# Patient Record
Sex: MALE | Race: BLACK OR AFRICAN AMERICAN | NOT HISPANIC OR LATINO | Employment: FULL TIME | URBAN - METROPOLITAN AREA
[De-identification: names, ages, dates, MRNs, and addresses within clinical notes are randomized per-mention and may not be internally consistent; named-entity substitution may affect disease eponyms.]

---

## 2017-04-20 ENCOUNTER — LAB (OUTPATIENT)
Dept: LAB | Facility: HOSPITAL | Age: 52
End: 2017-04-20
Attending: ORTHOPAEDIC SURGERY

## 2017-04-20 ENCOUNTER — HOSPITAL ENCOUNTER (OUTPATIENT)
Dept: GENERAL RADIOLOGY | Facility: HOSPITAL | Age: 52
Discharge: HOME | End: 2017-04-20
Attending: ORTHOPAEDIC SURGERY

## 2017-04-20 ENCOUNTER — TRANSCRIBE ORDERS (OUTPATIENT)
Dept: LAB | Facility: HOSPITAL | Age: 52
End: 2017-04-20

## 2017-04-20 ENCOUNTER — HOSPITAL ENCOUNTER (OUTPATIENT)
Dept: CARDIOLOGY | Facility: HOSPITAL | Age: 52
Discharge: HOME | End: 2017-04-20
Attending: ORTHOPAEDIC SURGERY | Admitting: ORTHOPAEDIC SURGERY

## 2017-04-20 DIAGNOSIS — Z01.811 PRE-OP CHEST EXAM: ICD-10-CM

## 2017-04-20 DIAGNOSIS — Z01.818 PRE-OP TESTING: ICD-10-CM

## 2017-04-20 DIAGNOSIS — Z01.818 PRE-OP TESTING: Primary | ICD-10-CM

## 2017-04-20 LAB
ALBUMIN SERPL-MCNC: 4 G/DL (ref 3.5–5.2)
ALBUMIN/GLOB SERPL: 1.1 G/DL
ALP SERPL-CCNC: 70 U/L (ref 39–117)
ALT SERPL W P-5'-P-CCNC: 16 U/L (ref 1–41)
ANION GAP SERPL CALCULATED.3IONS-SCNC: 14.3 MMOL/L
AST SERPL-CCNC: 14 U/L (ref 1–40)
BACTERIA UR QL AUTO: ABNORMAL /HPF
BASOPHILS # BLD AUTO: 0.01 10*3/MM3 (ref 0–0.2)
BASOPHILS NFR BLD AUTO: 0.1 % (ref 0–1.5)
BILIRUB SERPL-MCNC: 0.3 MG/DL (ref 0.1–1.2)
BILIRUB UR QL STRIP: NEGATIVE
BUN BLD-MCNC: 13 MG/DL (ref 6–20)
BUN/CREAT SERPL: 16.9 (ref 7–25)
CALCIUM SPEC-SCNC: 9.1 MG/DL (ref 8.6–10.5)
CHLORIDE SERPL-SCNC: 102 MMOL/L (ref 98–107)
CLARITY UR: CLEAR
CO2 SERPL-SCNC: 22.7 MMOL/L (ref 22–29)
COLOR UR: YELLOW
CREAT BLD-MCNC: 0.77 MG/DL (ref 0.76–1.27)
DEPRECATED RDW RBC AUTO: 40.5 FL (ref 37–54)
EOSINOPHIL # BLD AUTO: 0.13 10*3/MM3 (ref 0–0.7)
EOSINOPHIL NFR BLD AUTO: 1.3 % (ref 0.3–6.2)
ERYTHROCYTE [DISTWIDTH] IN BLOOD BY AUTOMATED COUNT: 13.1 % (ref 11.5–14.5)
GFR SERPL CREATININE-BSD FRML MDRD: 130 ML/MIN/1.73
GLOBULIN UR ELPH-MCNC: 3.6 GM/DL
GLUCOSE BLD-MCNC: 118 MG/DL (ref 65–99)
GLUCOSE UR STRIP-MCNC: NEGATIVE MG/DL
HCT VFR BLD AUTO: 44.8 % (ref 40.4–52.2)
HGB BLD-MCNC: 14.8 G/DL (ref 13.7–17.6)
HGB UR QL STRIP.AUTO: NEGATIVE
HYALINE CASTS UR QL AUTO: ABNORMAL /LPF
IMM GRANULOCYTES # BLD: 0.02 10*3/MM3 (ref 0–0.03)
IMM GRANULOCYTES NFR BLD: 0.2 % (ref 0–0.5)
KETONES UR QL STRIP: NEGATIVE
LEUKOCYTE ESTERASE UR QL STRIP.AUTO: ABNORMAL
LYMPHOCYTES # BLD AUTO: 2.23 10*3/MM3 (ref 0.9–4.8)
LYMPHOCYTES NFR BLD AUTO: 22.8 % (ref 19.6–45.3)
MCH RBC QN AUTO: 28.1 PG (ref 27–32.7)
MCHC RBC AUTO-ENTMCNC: 33 G/DL (ref 32.6–36.4)
MCV RBC AUTO: 85 FL (ref 79.8–96.2)
MONOCYTES # BLD AUTO: 0.41 10*3/MM3 (ref 0.2–1.2)
MONOCYTES NFR BLD AUTO: 4.2 % (ref 5–12)
NEUTROPHILS # BLD AUTO: 6.97 10*3/MM3 (ref 1.9–8.1)
NEUTROPHILS NFR BLD AUTO: 71.4 % (ref 42.7–76)
NITRITE UR QL STRIP: NEGATIVE
PH UR STRIP.AUTO: <=5 [PH] (ref 5–8)
PLATELET # BLD AUTO: 249 10*3/MM3 (ref 140–500)
PMV BLD AUTO: 9.9 FL (ref 6–12)
POTASSIUM BLD-SCNC: 4.1 MMOL/L (ref 3.5–5.2)
PROT SERPL-MCNC: 7.6 G/DL (ref 6–8.5)
PROT UR QL STRIP: NEGATIVE
RBC # BLD AUTO: 5.27 10*6/MM3 (ref 4.6–6)
RBC # UR: ABNORMAL /HPF
REF LAB TEST METHOD: ABNORMAL
SODIUM BLD-SCNC: 139 MMOL/L (ref 136–145)
SP GR UR STRIP: 1.02 (ref 1–1.03)
SQUAMOUS #/AREA URNS HPF: ABNORMAL /HPF
UROBILINOGEN UR QL STRIP: ABNORMAL
WBC NRBC COR # BLD: 9.77 10*3/MM3 (ref 4.5–10.7)
WBC UR QL AUTO: ABNORMAL /HPF

## 2017-04-20 PROCEDURE — 71020 HC CHEST PA AND LATERAL: CPT

## 2017-04-20 PROCEDURE — 81001 URINALYSIS AUTO W/SCOPE: CPT

## 2017-04-20 PROCEDURE — 93005 ELECTROCARDIOGRAM TRACING: CPT | Performed by: ORTHOPAEDIC SURGERY

## 2017-04-20 PROCEDURE — 80053 COMPREHEN METABOLIC PANEL: CPT

## 2017-04-20 PROCEDURE — 85025 COMPLETE CBC W/AUTO DIFF WBC: CPT

## 2017-04-20 PROCEDURE — 93010 ELECTROCARDIOGRAM REPORT: CPT | Performed by: INTERNAL MEDICINE

## 2017-04-20 PROCEDURE — 36415 COLL VENOUS BLD VENIPUNCTURE: CPT

## 2018-05-17 ENCOUNTER — HOSPITAL ENCOUNTER (EMERGENCY)
Facility: HOSPITAL | Age: 53
Discharge: HOME | End: 2018-05-17
Attending: EMERGENCY MEDICINE | Admitting: EMERGENCY MEDICINE

## 2018-05-17 ENCOUNTER — APPOINTMENT (OUTPATIENT)
Dept: GENERAL RADIOLOGY | Facility: HOSPITAL | Age: 53
End: 2018-05-17

## 2018-05-17 ENCOUNTER — APPOINTMENT (OUTPATIENT)
Dept: CT IMAGING | Facility: HOSPITAL | Age: 53
End: 2018-05-17

## 2018-05-17 VITALS
OXYGEN SATURATION: 97 % | SYSTOLIC BLOOD PRESSURE: 161 MMHG | HEART RATE: 69 BPM | WEIGHT: 260 LBS | RESPIRATION RATE: 18 BRPM | DIASTOLIC BLOOD PRESSURE: 91 MMHG | HEIGHT: 76 IN | BODY MASS INDEX: 31.66 KG/M2 | TEMPERATURE: 96.8 F

## 2018-05-17 DIAGNOSIS — N20.0 KIDNEY STONE: Primary | ICD-10-CM

## 2018-05-17 LAB
ALBUMIN SERPL-MCNC: 4.1 G/DL (ref 3.5–5.2)
ALBUMIN/GLOB SERPL: 1.2 G/DL
ALP SERPL-CCNC: 64 U/L (ref 39–117)
ALT SERPL W P-5'-P-CCNC: 13 U/L (ref 1–41)
ANION GAP SERPL CALCULATED.3IONS-SCNC: 10 MMOL/L
AST SERPL-CCNC: 16 U/L (ref 1–40)
BACTERIA UR QL AUTO: ABNORMAL /HPF
BASOPHILS # BLD AUTO: 0.02 10*3/MM3 (ref 0–0.2)
BASOPHILS NFR BLD AUTO: 0.2 % (ref 0–1.5)
BILIRUB SERPL-MCNC: 0.3 MG/DL (ref 0.1–1.2)
BILIRUB UR QL STRIP: NEGATIVE
BUN BLD-MCNC: 11 MG/DL (ref 6–20)
BUN/CREAT SERPL: 12.6 (ref 7–25)
CALCIUM SPEC-SCNC: 9.1 MG/DL (ref 8.6–10.5)
CHLORIDE SERPL-SCNC: 107 MMOL/L (ref 98–107)
CLARITY UR: ABNORMAL
CO2 SERPL-SCNC: 27 MMOL/L (ref 22–29)
COLOR UR: YELLOW
CREAT BLD-MCNC: 0.87 MG/DL (ref 0.76–1.27)
DEPRECATED RDW RBC AUTO: 42.5 FL (ref 37–54)
EOSINOPHIL # BLD AUTO: 0.13 10*3/MM3 (ref 0–0.7)
EOSINOPHIL NFR BLD AUTO: 1.4 % (ref 0.3–6.2)
ERYTHROCYTE [DISTWIDTH] IN BLOOD BY AUTOMATED COUNT: 13.4 % (ref 11.5–14.5)
GFR SERPL CREATININE-BSD FRML MDRD: 113 ML/MIN/1.73
GLOBULIN UR ELPH-MCNC: 3.3 GM/DL
GLUCOSE BLD-MCNC: 108 MG/DL (ref 65–99)
GLUCOSE UR STRIP-MCNC: NEGATIVE MG/DL
HCT VFR BLD AUTO: 47.9 % (ref 40.4–52.2)
HGB BLD-MCNC: 15.4 G/DL (ref 13.7–17.6)
HGB UR QL STRIP.AUTO: ABNORMAL
HOLD SPECIMEN: NORMAL
HOLD SPECIMEN: NORMAL
HYALINE CASTS UR QL AUTO: ABNORMAL /LPF
IMM GRANULOCYTES # BLD: 0.02 10*3/MM3 (ref 0–0.03)
IMM GRANULOCYTES NFR BLD: 0.2 % (ref 0–0.5)
KETONES UR QL STRIP: NEGATIVE
LEUKOCYTE ESTERASE UR QL STRIP.AUTO: ABNORMAL
LIPASE SERPL-CCNC: 123 U/L (ref 13–60)
LYMPHOCYTES # BLD AUTO: 3.42 10*3/MM3 (ref 0.9–4.8)
LYMPHOCYTES NFR BLD AUTO: 35.8 % (ref 19.6–45.3)
MCH RBC QN AUTO: 27.9 PG (ref 27–32.7)
MCHC RBC AUTO-ENTMCNC: 32.2 G/DL (ref 32.6–36.4)
MCV RBC AUTO: 86.9 FL (ref 79.8–96.2)
MONOCYTES # BLD AUTO: 0.45 10*3/MM3 (ref 0.2–1.2)
MONOCYTES NFR BLD AUTO: 4.7 % (ref 5–12)
NEUTROPHILS # BLD AUTO: 5.5 10*3/MM3 (ref 1.9–8.1)
NEUTROPHILS NFR BLD AUTO: 57.7 % (ref 42.7–76)
NITRITE UR QL STRIP: NEGATIVE
PH UR STRIP.AUTO: <=5 [PH] (ref 5–8)
PLATELET # BLD AUTO: 232 10*3/MM3 (ref 140–500)
PMV BLD AUTO: 9.9 FL (ref 6–12)
POTASSIUM BLD-SCNC: 4.5 MMOL/L (ref 3.5–5.2)
PROT SERPL-MCNC: 7.4 G/DL (ref 6–8.5)
PROT UR QL STRIP: ABNORMAL
RBC # BLD AUTO: 5.51 10*6/MM3 (ref 4.6–6)
RBC # UR: ABNORMAL /HPF
REF LAB TEST METHOD: ABNORMAL
SODIUM BLD-SCNC: 144 MMOL/L (ref 136–145)
SP GR UR STRIP: 1.03 (ref 1–1.03)
SQUAMOUS #/AREA URNS HPF: ABNORMAL /HPF
UROBILINOGEN UR QL STRIP: ABNORMAL
WBC NRBC COR # BLD: 9.54 10*3/MM3 (ref 4.5–10.7)
WBC UR QL AUTO: ABNORMAL /HPF
WHOLE BLOOD HOLD SPECIMEN: NORMAL
WHOLE BLOOD HOLD SPECIMEN: NORMAL

## 2018-05-17 PROCEDURE — 96361 HYDRATE IV INFUSION ADD-ON: CPT

## 2018-05-17 PROCEDURE — 25010000002 KETOROLAC TROMETHAMINE PER 15 MG: Performed by: NURSE PRACTITIONER

## 2018-05-17 PROCEDURE — 80053 COMPREHEN METABOLIC PANEL: CPT

## 2018-05-17 PROCEDURE — 74018 RADEX ABDOMEN 1 VIEW: CPT

## 2018-05-17 PROCEDURE — 85025 COMPLETE CBC W/AUTO DIFF WBC: CPT

## 2018-05-17 PROCEDURE — 96375 TX/PRO/DX INJ NEW DRUG ADDON: CPT

## 2018-05-17 PROCEDURE — 74176 CT ABD & PELVIS W/O CONTRAST: CPT

## 2018-05-17 PROCEDURE — 87086 URINE CULTURE/COLONY COUNT: CPT | Performed by: EMERGENCY MEDICINE

## 2018-05-17 PROCEDURE — 96374 THER/PROPH/DIAG INJ IV PUSH: CPT

## 2018-05-17 PROCEDURE — 99284 EMERGENCY DEPT VISIT MOD MDM: CPT

## 2018-05-17 PROCEDURE — 81001 URINALYSIS AUTO W/SCOPE: CPT | Performed by: EMERGENCY MEDICINE

## 2018-05-17 PROCEDURE — 83690 ASSAY OF LIPASE: CPT

## 2018-05-17 PROCEDURE — 36415 COLL VENOUS BLD VENIPUNCTURE: CPT

## 2018-05-17 PROCEDURE — 25010000002 ONDANSETRON PER 1 MG: Performed by: NURSE PRACTITIONER

## 2018-05-17 PROCEDURE — 25010000002 MORPHINE PER 10 MG: Performed by: NURSE PRACTITIONER

## 2018-05-17 RX ORDER — ONDANSETRON 2 MG/ML
4 INJECTION INTRAMUSCULAR; INTRAVENOUS ONCE
Status: COMPLETED | OUTPATIENT
Start: 2018-05-17 | End: 2018-05-17

## 2018-05-17 RX ORDER — NAPROXEN SODIUM 220 MG
220 TABLET ORAL 2 TIMES DAILY PRN
Status: ON HOLD | COMMUNITY
End: 2018-05-22

## 2018-05-17 RX ORDER — SODIUM CHLORIDE 0.9 % (FLUSH) 0.9 %
10 SYRINGE (ML) INJECTION AS NEEDED
Status: DISCONTINUED | OUTPATIENT
Start: 2018-05-17 | End: 2018-05-17 | Stop reason: HOSPADM

## 2018-05-17 RX ORDER — KETOROLAC TROMETHAMINE 15 MG/ML
15 INJECTION, SOLUTION INTRAMUSCULAR; INTRAVENOUS ONCE
Status: COMPLETED | OUTPATIENT
Start: 2018-05-17 | End: 2018-05-17

## 2018-05-17 RX ADMIN — MORPHINE SULFATE 4 MG: 4 INJECTION INTRAVENOUS at 09:02

## 2018-05-17 RX ADMIN — ONDANSETRON 4 MG: 2 INJECTION INTRAMUSCULAR; INTRAVENOUS at 07:08

## 2018-05-17 RX ADMIN — SODIUM CHLORIDE 1000 ML: 9 INJECTION, SOLUTION INTRAVENOUS at 07:08

## 2018-05-17 RX ADMIN — KETOROLAC TROMETHAMINE 15 MG: 15 INJECTION, SOLUTION INTRAMUSCULAR; INTRAVENOUS at 07:08

## 2018-05-17 NOTE — ED PROVIDER NOTES
Pt presents to the ED c/o L flank pain which began after waking up this morning. Pt also c/o dark urine.  Pt reports hx of kidney stones, but does not remember the pain associated since it was several years ago.    On exam, pt is A+Ox3 and in mild distress.  Pt's abd is soft and nontender with L CVA tenderness.  Pt has a nonfocal neuro exam.      0821  CT Abd/Pelvis shows 8 mm proximal left ureteral calculus with mild hydronephrosis and perinephric stranding. Dr. Graham (urology) is currently in the ER evaluating the pt.      Plan for discharge upon XR Abd KUB results (requested by Dr. Graham).    MD ATTESTATION NOTE    The CELESTE and I have discussed this patient's history, physical exam, and treatment plan.  I have reviewed the documentation and personally had a face to face interaction with the patient. I affirm the documentation and agree with the treatment and plan.  The attached note describes my personal findings.    Documentation assistance provided by anthony Moran for Dr. Hernandez. Information recorded by the scribe was done at my direction and has been verified and validated by me.                   Raiza Moran  05/17/18 7034       Raj Hernandez MD  05/17/18 5812

## 2018-05-17 NOTE — CONSULTS
PRINCIPAL DIAGNOSIS: Left ureteral stone.    HISTORY OF PRESENT ILLNESS: This very pleasant white male presented to the emergency room on 05/17/2018 with a sudden onset of left flank pain. He states his pain had been present for several hours. It radiated to the left groin. He denies any associated, nausea, vomiting, or dysuria. He did have some difficulties with urination in the emergency room but has not had any difficulties up until this time. He denies any fever or chills. He states his urine was dark this morning. I was asked to see and evaluate the patient for an 8 mm proximal left ureteral stone.    PAST SURGICAL HISTORY:   1.  Previous appendectomy.  2.  Multiple orthopedic procedures.  3.  Tonsilloadenoidectomy.    REVIEW OF SYSTEMS: Positive for left flank pain with radiation to the left groin and dark urine. Remainder of a 12-point review of systems is negative.    SOCIAL HISTORY: Patient lives at home and is . He is a smoker.    PHYSICAL EXAMINATION:  GENERAL: He is a well-nourished, well-developed white male, in no apparent distress, alert and oriented x3.  COR: Regular rate and rhythm. No S3, S4, murmur, or rub.  LUNGS: Clear to auscultation.  ABDOMEN: Flanks benign.    IMPRESSION: Left ureteral stone.    PLAN: This stone is much too large to expect it to pass spontaneously. I have recommended outpatient lithotripsy. I have discussed the pros, cons, risks, complications. This may be a staged procedure. He understands the risks and complications and wishes to proceed.

## 2018-05-17 NOTE — ED NOTES
Patient attempted to give urine sample, unable to urinate at this time. Will attempt again later.     Kelli Nicholson RN  05/17/18 2868

## 2018-05-17 NOTE — ED NOTES
Patient presents to ED with c/o left flank pain 8/10 that started this morning around 530am that woke him from his sleep. Patient hx of kidney stones but has not experienced one since his 20's. Pt denies chest pain, weakness in extremities, abdominal pain, dizziness, blurred vision, loss in control of bowel and/or bladder and numbness/tingling of extremities. Pt is alert and oriented X4, PERRLA, respirations are even and unlabored, chest rise and fall is equal in expansion.  Pt does not appear to be in distress at this time.     Kelli Nicholson RN  05/17/18 0620

## 2018-05-17 NOTE — ED NOTES
Patient is unable to give urine sample at this time and refuses catheterization. Will try again to give clean catch sample at a later time.     Josephine Varela RN  05/17/18 6796

## 2018-05-17 NOTE — ED PROVIDER NOTES
EMERGENCY DEPARTMENT ENCOUNTER    CHIEF COMPLAINT  Chief Complaint: Flank pain  History given by:Pt  History limited by:Nothing  Room Number: 11/11  PMD: JUVENTINO Giraldo MD      HPI:  Pt is a 52 y.o. male who presents with L flank pain after waking up this morning. Pt reports his L flank pain radiates into his L abdomen. Pt also c/o dark urine. Pt denies N/V, or difficulty urinating. He reports a prior hx of kidney stones when he was 23 y.o.    Duration: this AM  Timing:constant  Location:L flank  Radiation:into L abdomen  Quality:pain  Intensity/Severity:moderate  Progression:unchanged  Associated Symptoms:dark urine  Aggravating Factors:none  Alleviating Factors:none  Previous Episodes:Pt reports a prior hx of kidney stones  Treatment before arrival:None stated PTA    PAST MEDICAL HISTORY  Active Ambulatory Problems     Diagnosis Date Noted   • No Active Ambulatory Problems     Resolved Ambulatory Problems     Diagnosis Date Noted   • No Resolved Ambulatory Problems     Past Medical History:   Diagnosis Date   • Arthritis    • Kidney stones        PAST SURGICAL HISTORY  Past Surgical History:   Procedure Laterality Date   • ADENOIDECTOMY     • APPENDECTOMY     • JOINT REPLACEMENT     • KNEE ARTHROSCOPY Bilateral    • TONSILLECTOMY     • TOTAL HIP ARTHROPLASTY Left        FAMILY HISTORY  History reviewed. No pertinent family history.    SOCIAL HISTORY  Social History     Social History   • Marital status:      Spouse name: N/A   • Number of children: N/A   • Years of education: N/A     Occupational History   • Not on file.     Social History Main Topics   • Smoking status: Current Every Day Smoker     Packs/day: 1.00   • Smokeless tobacco: Not on file   • Alcohol use Yes      Comment: socially   • Drug use: No   • Sexual activity: Defer     Other Topics Concern   • Not on file     Social History Narrative   • No narrative on file         ALLERGIES  Patient has no known allergies.    REVIEW OF SYSTEMS  Review  of Systems   Constitutional: Negative for activity change, appetite change and fever.   HENT: Negative for congestion and sore throat.    Eyes: Negative.    Respiratory: Negative for cough and shortness of breath.    Cardiovascular: Negative for chest pain and leg swelling.   Gastrointestinal: Negative for abdominal pain, diarrhea, nausea and vomiting.   Endocrine: Negative.    Genitourinary: Positive for flank pain (L). Negative for decreased urine volume, difficulty urinating and dysuria.        Pt c/o dark urine   Musculoskeletal: Negative for neck pain.   Skin: Negative for rash and wound.   Allergic/Immunologic: Negative.    Neurological: Negative for weakness, numbness and headaches.   Hematological: Negative.    Psychiatric/Behavioral: Negative.    All other systems reviewed and are negative.      PHYSICAL EXAM  ED Triage Vitals   Temp Heart Rate Resp BP SpO2   05/17/18 0638 05/17/18 0638 05/17/18 0638 05/17/18 0650 05/17/18 0638   97.6 °F (36.4 °C) 81 18 (!) 169/101 99 %      Temp src Heart Rate Source Patient Position BP Location FiO2 (%)   05/17/18 0638 05/17/18 0638 05/17/18 0650 05/17/18 0650 --   Tympanic Monitor Sitting Right arm        Physical Exam   Constitutional: He is well-developed, well-nourished, and in no distress. No distress.   HENT:   Head: Normocephalic.   Mouth/Throat: Oropharynx is clear and moist and mucous membranes are normal.   Eyes: Pupils are equal, round, and reactive to light.   Neck: Normal range of motion.   Cardiovascular: Normal rate, regular rhythm and normal heart sounds.    Pulmonary/Chest: Effort normal and breath sounds normal. He has no wheezes.   Abdominal: Soft. Bowel sounds are normal. There is no tenderness. There is CVA tenderness (mild L).   Musculoskeletal: Normal range of motion. He exhibits no edema.   Neurological: He is alert.   Skin: Skin is warm and dry. No rash noted.   Psychiatric: Mood, memory, affect and judgment normal.   Nursing note and vitals  reviewed.      LAB RESULTS  Recent Results (from the past 24 hour(s))   Comprehensive Metabolic Panel    Collection Time: 05/17/18  6:53 AM   Result Value Ref Range    Glucose 108 (H) 65 - 99 mg/dL    BUN 11 6 - 20 mg/dL    Creatinine 0.87 0.76 - 1.27 mg/dL    Sodium 144 136 - 145 mmol/L    Potassium 4.5 3.5 - 5.2 mmol/L    Chloride 107 98 - 107 mmol/L    CO2 27.0 22.0 - 29.0 mmol/L    Calcium 9.1 8.6 - 10.5 mg/dL    Total Protein 7.4 6.0 - 8.5 g/dL    Albumin 4.10 3.50 - 5.20 g/dL    ALT (SGPT) 13 1 - 41 U/L    AST (SGOT) 16 1 - 40 U/L    Alkaline Phosphatase 64 39 - 117 U/L    Total Bilirubin 0.3 0.1 - 1.2 mg/dL    eGFR  African Amer 113 >60 mL/min/1.73    Globulin 3.3 gm/dL    A/G Ratio 1.2 g/dL    BUN/Creatinine Ratio 12.6 7.0 - 25.0    Anion Gap 10.0 mmol/L   Lipase    Collection Time: 05/17/18  6:53 AM   Result Value Ref Range    Lipase 123 (H) 13 - 60 U/L   Light Blue Top    Collection Time: 05/17/18  6:53 AM   Result Value Ref Range    Extra Tube hold for add-on    Green Top (Gel)    Collection Time: 05/17/18  6:53 AM   Result Value Ref Range    Extra Tube Hold for add-ons.    Lavender Top    Collection Time: 05/17/18  6:53 AM   Result Value Ref Range    Extra Tube hold for add-on    Gold Top - SST    Collection Time: 05/17/18  6:53 AM   Result Value Ref Range    Extra Tube Hold for add-ons.    CBC Auto Differential    Collection Time: 05/17/18  6:53 AM   Result Value Ref Range    WBC 9.54 4.50 - 10.70 10*3/mm3    RBC 5.51 4.60 - 6.00 10*6/mm3    Hemoglobin 15.4 13.7 - 17.6 g/dL    Hematocrit 47.9 40.4 - 52.2 %    MCV 86.9 79.8 - 96.2 fL    MCH 27.9 27.0 - 32.7 pg    MCHC 32.2 (L) 32.6 - 36.4 g/dL    RDW 13.4 11.5 - 14.5 %    RDW-SD 42.5 37.0 - 54.0 fl    MPV 9.9 6.0 - 12.0 fL    Platelets 232 140 - 500 10*3/mm3    Neutrophil % 57.7 42.7 - 76.0 %    Lymphocyte % 35.8 19.6 - 45.3 %    Monocyte % 4.7 (L) 5.0 - 12.0 %    Eosinophil % 1.4 0.3 - 6.2 %    Basophil % 0.2 0.0 - 1.5 %    Immature Grans % 0.2 0.0 -  0.5 %    Neutrophils, Absolute 5.50 1.90 - 8.10 10*3/mm3    Lymphocytes, Absolute 3.42 0.90 - 4.80 10*3/mm3    Monocytes, Absolute 0.45 0.20 - 1.20 10*3/mm3    Eosinophils, Absolute 0.13 0.00 - 0.70 10*3/mm3    Basophils, Absolute 0.02 0.00 - 0.20 10*3/mm3    Immature Grans, Absolute 0.02 0.00 - 0.03 10*3/mm3   Urinalysis With / Culture If Indicated - Urine, Clean Catch    Collection Time: 05/17/18  9:03 AM   Result Value Ref Range    Color, UA Yellow Yellow, Straw    Appearance, UA Cloudy (A) Clear    pH, UA <=5.0 5.0 - 8.0    Specific Gravity, UA 1.027 1.005 - 1.030    Glucose, UA Negative Negative    Ketones, UA Negative Negative    Bilirubin, UA Negative Negative    Blood, UA Large (3+) (A) Negative    Protein, UA Trace (A) Negative    Leuk Esterase, UA Trace (A) Negative    Nitrite, UA Negative Negative    Urobilinogen, UA 1.0 E.U./dL 0.2 - 1.0 E.U./dL   Urinalysis, Microscopic Only - Urine, Clean Catch    Collection Time: 05/17/18  9:03 AM   Result Value Ref Range    RBC, UA Too Numerous to Count (A) None Seen, 0-2 /HPF    WBC, UA 6-12 (A) None Seen, 0-2 /HPF    Bacteria, UA None Seen None Seen /HPF    Squamous Epithelial Cells, UA 0-2 None Seen, 0-2 /HPF    Hyaline Casts, UA 0-2 None Seen /LPF    Methodology Automated Microscopy        I ordered the above labs and reviewed the results    RADIOLOGY  CT Abdomen Pelvis Without Contrast   Preliminary Result   8 mm proximal left ureteral calculus with mild   hydronephrosis and perinephric stranding.        I called the findings to Opal Weldon in the emergency department at   7:55 AM.               XR Abdomen KUB    (Results Pending)       I ordered the above noted radiological studies and reviewed the images on the PACS system.  Spoke with Dr. Iverson regarding CT scan results      PROGRESS AND CONSULTS    0740 - Reviewed pt's history and workup with Dr. Hernandez.  After a bedside evaluation; Dr Hernandez agrees with the plan of care    0759 - Reviewed pt's CT  "abd/pelvis which shows an 8 mm L proximal kidney stone. Call placed with urology.     0811 - Rechecked pt. Pt is resting comfortably in NAD. Informed pt of the result of his CT abd/pelvis which is shows an 8 mm L kidney stone. D/w pt the plan to consult with urology. '    0818 - Discussed pt care with Dr. Graham (Urology) who states he will see the pt in the ED with the plan of likely discharge home.     0836 - Pt has been seen and evaluated by Dr. Graham who is ordering an XR KUB for the pt and then discharging the pt home with follow up.     0946 - Rechecked pt. Pt is resting comfortably in NAD after additional pain medication. Pt reports that Dr. Graham provided prescriptions for pain medication and follow up information. D/w pt the plan to discharge home at this time. He understands and agrees with plan. All questions answered.     COURSE & MEDICAL DECISION MAKING  Pertinent Labs and Imaging studies that were ordered and reviewed are noted above.  Results were reviewed/discussed with the patient and they were also made aware of online assess.   Pt also made aware that some labs, such as cultures, will not be resulted during ER visit and follow up with PMD is necessary.     MEDICATIONS GIVEN IN ER  Medications   sodium chloride 0.9 % flush 10 mL (not administered)   sodium chloride 0.9 % bolus 1,000 mL (0 mL Intravenous Stopped 5/17/18 0811)   ondansetron (ZOFRAN) injection 4 mg (4 mg Intravenous Given 5/17/18 0708)   ketorolac (TORADOL) injection 15 mg (15 mg Intravenous Given 5/17/18 0708)   morphine injection 4 mg (4 mg Intravenous Given 5/17/18 0902)       /94   Pulse 70   Temp 97.6 °F (36.4 °C) (Tympanic)   Resp 18   Ht 193 cm (76\")   Wt 118 kg (260 lb)   SpO2 98%   BMI 31.65 kg/m²     Discussed all results and noted any abnormalities with patient.  Discussed absoute need to recheck abnormalities with PCP.     Reviewed implications of results, diagnosis, meds, responsibility to follow up, " warning signs and symptoms of possible worsening, potential complications and reasons to return to ER with patient    Discussed plan for discharge, as there is no emergent indication for admission.  Pt is agreeable and understands need for follow up and repeat testing.  Pt is aware that discharge does not mean that nothing is wrong but it indicates no emergency is present and they must continue care with PCP.  Pt is discharged with instructions to follow up with primary care doctor to have their blood pressure rechecked.       DIAGNOSIS  Final diagnoses:   Kidney stone       FOLLOW UP   [facility]  [address] [facility] [address]  [phone]  Schedule an appointment as soon as possible for a visit         RX     Medication List      No changes were made to your prescriptions during this visit.       Risks, benefits, alternatives discussed with patient.  Pt consents to treatment and agrees to follow up with PMD tomorrow for further care and any other prescriptions.     I personally reviewed the past medical history, past surgical history, social history, family history, current medications and allergies as they appear in this chart.  The scribe's note accurately reflects the work and decisions made by me.     Documentation assistance provided by anthony Gutierrez for RUIZ Huggins.  Information recorded by the anthony was done at my direction and has been verified and validated by me.       Parminder Gutierrez  05/17/18 9788       RUIZ Penny  05/17/18 0344

## 2018-05-17 NOTE — ED NOTES
Patient is unable to give urine sample at this time and refuses catheterization. Will try again to give clean catch sample at a later time.     Josephine Varela RN  05/17/18 8566

## 2018-05-17 NOTE — DISCHARGE INSTRUCTIONS
Pt instructions:  Rest  Drink plenty of water  Follow up with Primary Care Doctor for further management and to have your blood pressure rechecked.   Return to ER with pain, swelling, numbness/tingling, fever, chills, weakness, nausea, vomiting, diarrhea, abdominal pain, back pain, urinary concerns, chest pain, shortness of breath, dizziness, headache, worsening of symptoms or other concerns.

## 2018-05-18 LAB — BACTERIA SPEC AEROBE CULT: NO GROWTH

## 2018-05-21 ENCOUNTER — HOSPITAL ENCOUNTER (INPATIENT)
Facility: HOSPITAL | Age: 53
LOS: 2 days | Discharge: HOME | End: 2018-05-23
Attending: EMERGENCY MEDICINE | Admitting: HOSPITALIST

## 2018-05-21 DIAGNOSIS — D72.829 LEUKOCYTOSIS, UNSPECIFIED TYPE: ICD-10-CM

## 2018-05-21 DIAGNOSIS — J02.9 EXUDATIVE PHARYNGITIS: ICD-10-CM

## 2018-05-21 DIAGNOSIS — N20.0 NEPHROLITHIASIS: Primary | ICD-10-CM

## 2018-05-21 DIAGNOSIS — N17.9 ACUTE RENAL FAILURE, UNSPECIFIED ACUTE RENAL FAILURE TYPE (HCC): ICD-10-CM

## 2018-05-21 LAB
ALBUMIN SERPL-MCNC: 3.9 G/DL (ref 3.5–5.2)
ALBUMIN/GLOB SERPL: 1.1 G/DL
ALP SERPL-CCNC: 57 U/L (ref 39–117)
ALT SERPL W P-5'-P-CCNC: 11 U/L (ref 1–41)
ANION GAP SERPL CALCULATED.3IONS-SCNC: 13.8 MMOL/L
AST SERPL-CCNC: 9 U/L (ref 1–40)
BACTERIA UR QL AUTO: ABNORMAL /HPF
BASOPHILS # BLD AUTO: 0.01 10*3/MM3 (ref 0–0.2)
BASOPHILS NFR BLD AUTO: 0.1 % (ref 0–1.5)
BILIRUB SERPL-MCNC: 0.5 MG/DL (ref 0.1–1.2)
BILIRUB UR QL STRIP: NEGATIVE
BUN BLD-MCNC: 14 MG/DL (ref 6–20)
BUN/CREAT SERPL: 9.5 (ref 7–25)
CALCIUM SPEC-SCNC: 9.2 MG/DL (ref 8.6–10.5)
CHLORIDE SERPL-SCNC: 97 MMOL/L (ref 98–107)
CLARITY UR: CLEAR
CO2 SERPL-SCNC: 25.2 MMOL/L (ref 22–29)
COLOR UR: YELLOW
CREAT BLD-MCNC: 1.47 MG/DL (ref 0.76–1.27)
D-LACTATE SERPL-SCNC: 0.6 MMOL/L (ref 0.5–2)
DEPRECATED RDW RBC AUTO: 39.3 FL (ref 37–54)
EOSINOPHIL # BLD AUTO: 0.11 10*3/MM3 (ref 0–0.7)
EOSINOPHIL NFR BLD AUTO: 0.8 % (ref 0.3–6.2)
ERYTHROCYTE [DISTWIDTH] IN BLOOD BY AUTOMATED COUNT: 12.9 % (ref 11.5–14.5)
GFR SERPL CREATININE-BSD FRML MDRD: 61 ML/MIN/1.73
GLOBULIN UR ELPH-MCNC: 3.5 GM/DL
GLUCOSE BLD-MCNC: 111 MG/DL (ref 65–99)
GLUCOSE UR STRIP-MCNC: NEGATIVE MG/DL
HCT VFR BLD AUTO: 42.2 % (ref 40.4–52.2)
HGB BLD-MCNC: 13.8 G/DL (ref 13.7–17.6)
HGB UR QL STRIP.AUTO: ABNORMAL
HOLD SPECIMEN: NORMAL
HOLD SPECIMEN: NORMAL
HYALINE CASTS UR QL AUTO: ABNORMAL /LPF
IMM GRANULOCYTES # BLD: 0.03 10*3/MM3 (ref 0–0.03)
IMM GRANULOCYTES NFR BLD: 0.2 % (ref 0–0.5)
KETONES UR QL STRIP: NEGATIVE
LEUKOCYTE ESTERASE UR QL STRIP.AUTO: ABNORMAL
LIPASE SERPL-CCNC: 10 U/L (ref 13–60)
LYMPHOCYTES # BLD AUTO: 1.69 10*3/MM3 (ref 0.9–4.8)
LYMPHOCYTES NFR BLD AUTO: 12 % (ref 19.6–45.3)
MCH RBC QN AUTO: 27.5 PG (ref 27–32.7)
MCHC RBC AUTO-ENTMCNC: 32.7 G/DL (ref 32.6–36.4)
MCV RBC AUTO: 84.2 FL (ref 79.8–96.2)
MONOCYTES # BLD AUTO: 0.86 10*3/MM3 (ref 0.2–1.2)
MONOCYTES NFR BLD AUTO: 6.1 % (ref 5–12)
NEUTROPHILS # BLD AUTO: 11.36 10*3/MM3 (ref 1.9–8.1)
NEUTROPHILS NFR BLD AUTO: 81 % (ref 42.7–76)
NITRITE UR QL STRIP: NEGATIVE
PH UR STRIP.AUTO: 5.5 [PH] (ref 5–8)
PLATELET # BLD AUTO: 217 10*3/MM3 (ref 140–500)
PMV BLD AUTO: 9.6 FL (ref 6–12)
POTASSIUM BLD-SCNC: 4.2 MMOL/L (ref 3.5–5.2)
PROT SERPL-MCNC: 7.4 G/DL (ref 6–8.5)
PROT UR QL STRIP: NEGATIVE
RBC # BLD AUTO: 5.01 10*6/MM3 (ref 4.6–6)
RBC # UR: ABNORMAL /HPF
REF LAB TEST METHOD: ABNORMAL
S PYO AG THROAT QL: NEGATIVE
SODIUM BLD-SCNC: 136 MMOL/L (ref 136–145)
SP GR UR STRIP: 1.02 (ref 1–1.03)
SQUAMOUS #/AREA URNS HPF: ABNORMAL /HPF
UROBILINOGEN UR QL STRIP: ABNORMAL
WBC NRBC COR # BLD: 14.03 10*3/MM3 (ref 4.5–10.7)
WBC UR QL AUTO: ABNORMAL /HPF
WHOLE BLOOD HOLD SPECIMEN: NORMAL
WHOLE BLOOD HOLD SPECIMEN: NORMAL

## 2018-05-21 PROCEDURE — 87880 STREP A ASSAY W/OPTIC: CPT | Performed by: EMERGENCY MEDICINE

## 2018-05-21 PROCEDURE — 25010000003 CEFTRIAXONE PER 250 MG: Performed by: EMERGENCY MEDICINE

## 2018-05-21 PROCEDURE — 87040 BLOOD CULTURE FOR BACTERIA: CPT | Performed by: EMERGENCY MEDICINE

## 2018-05-21 PROCEDURE — 83690 ASSAY OF LIPASE: CPT | Performed by: EMERGENCY MEDICINE

## 2018-05-21 PROCEDURE — 87081 CULTURE SCREEN ONLY: CPT | Performed by: EMERGENCY MEDICINE

## 2018-05-21 PROCEDURE — 83605 ASSAY OF LACTIC ACID: CPT | Performed by: EMERGENCY MEDICINE

## 2018-05-21 PROCEDURE — 81001 URINALYSIS AUTO W/SCOPE: CPT | Performed by: EMERGENCY MEDICINE

## 2018-05-21 PROCEDURE — 25010000002 HYDROMORPHONE PER 4 MG: Performed by: EMERGENCY MEDICINE

## 2018-05-21 PROCEDURE — 80053 COMPREHEN METABOLIC PANEL: CPT | Performed by: EMERGENCY MEDICINE

## 2018-05-21 PROCEDURE — 99284 EMERGENCY DEPT VISIT MOD MDM: CPT

## 2018-05-21 PROCEDURE — 85025 COMPLETE CBC W/AUTO DIFF WBC: CPT | Performed by: EMERGENCY MEDICINE

## 2018-05-21 PROCEDURE — 87086 URINE CULTURE/COLONY COUNT: CPT | Performed by: EMERGENCY MEDICINE

## 2018-05-21 RX ORDER — PROMETHAZINE HYDROCHLORIDE 25 MG/1
25 TABLET ORAL EVERY 6 HOURS PRN
Status: ON HOLD | COMMUNITY
End: 2018-05-22

## 2018-05-21 RX ORDER — SODIUM CHLORIDE 9 MG/ML
125 INJECTION, SOLUTION INTRAVENOUS CONTINUOUS
Status: DISCONTINUED | OUTPATIENT
Start: 2018-05-21 | End: 2018-05-23

## 2018-05-21 RX ORDER — HYDROMORPHONE HCL 110MG/55ML
0.5 PATIENT CONTROLLED ANALGESIA SYRINGE INTRAVENOUS ONCE
Status: COMPLETED | OUTPATIENT
Start: 2018-05-21 | End: 2018-05-21

## 2018-05-21 RX ORDER — SODIUM CHLORIDE 0.9 % (FLUSH) 0.9 %
10 SYRINGE (ML) INJECTION AS NEEDED
Status: DISCONTINUED | OUTPATIENT
Start: 2018-05-21 | End: 2018-05-23 | Stop reason: HOSPADM

## 2018-05-21 RX ORDER — ACETAMINOPHEN 500 MG
1000 TABLET ORAL ONCE
Status: COMPLETED | OUTPATIENT
Start: 2018-05-21 | End: 2018-05-21

## 2018-05-21 RX ORDER — CEFTRIAXONE SODIUM 2 G/50ML
2 INJECTION, SOLUTION INTRAVENOUS ONCE
Status: COMPLETED | OUTPATIENT
Start: 2018-05-21 | End: 2018-05-21

## 2018-05-21 RX ORDER — HYDROCODONE BITARTRATE AND ACETAMINOPHEN 5; 325 MG/1; MG/1
1 TABLET ORAL EVERY 6 HOURS PRN
Status: ON HOLD | COMMUNITY
End: 2018-05-22

## 2018-05-21 RX ADMIN — SODIUM CHLORIDE 125 ML/HR: 9 INJECTION, SOLUTION INTRAVENOUS at 23:58

## 2018-05-21 RX ADMIN — HYDROMORPHONE HYDROCHLORIDE 0.5 MG: 2 INJECTION INTRAMUSCULAR; INTRAVENOUS; SUBCUTANEOUS at 22:54

## 2018-05-21 RX ADMIN — SODIUM CHLORIDE 1000 ML: 9 INJECTION, SOLUTION INTRAVENOUS at 22:52

## 2018-05-21 RX ADMIN — CEFTRIAXONE SODIUM 2 G: 2 INJECTION, SOLUTION INTRAVENOUS at 22:16

## 2018-05-21 RX ADMIN — ACETAMINOPHEN 1000 MG: 500 TABLET, FILM COATED ORAL at 22:00

## 2018-05-22 ENCOUNTER — ANESTHESIA EVENT (OUTPATIENT)
Dept: PERIOP | Facility: HOSPITAL | Age: 53
End: 2018-05-22

## 2018-05-22 ENCOUNTER — ANESTHESIA (OUTPATIENT)
Dept: PERIOP | Facility: HOSPITAL | Age: 53
End: 2018-05-22

## 2018-05-22 ENCOUNTER — APPOINTMENT (OUTPATIENT)
Dept: GENERAL RADIOLOGY | Facility: HOSPITAL | Age: 53
End: 2018-05-22

## 2018-05-22 PROBLEM — D72.829 LEUKOCYTOSIS: Status: ACTIVE | Noted: 2018-05-22

## 2018-05-22 PROBLEM — J02.9 EXUDATIVE PHARYNGITIS: Status: ACTIVE | Noted: 2018-05-22

## 2018-05-22 PROBLEM — Z72.0 TOBACCO ABUSE: Status: ACTIVE | Noted: 2018-05-22

## 2018-05-22 PROBLEM — N13.30 HYDRONEPHROSIS: Status: ACTIVE | Noted: 2018-05-22

## 2018-05-22 PROBLEM — N17.9 AKI (ACUTE KIDNEY INJURY) (HCC): Status: ACTIVE | Noted: 2018-05-22

## 2018-05-22 LAB
ANION GAP SERPL CALCULATED.3IONS-SCNC: 12.8 MMOL/L
B PERT DNA SPEC QL NAA+PROBE: NOT DETECTED
BASOPHILS # BLD AUTO: 0.01 10*3/MM3 (ref 0–0.2)
BASOPHILS NFR BLD AUTO: 0.1 % (ref 0–1.5)
BUN BLD-MCNC: 14 MG/DL (ref 6–20)
BUN/CREAT SERPL: 9.3 (ref 7–25)
C PNEUM DNA NPH QL NAA+NON-PROBE: NOT DETECTED
CALCIUM SPEC-SCNC: 8.8 MG/DL (ref 8.6–10.5)
CHLORIDE SERPL-SCNC: 99 MMOL/L (ref 98–107)
CO2 SERPL-SCNC: 26.2 MMOL/L (ref 22–29)
CREAT BLD-MCNC: 1.5 MG/DL (ref 0.76–1.27)
DEPRECATED RDW RBC AUTO: 40.8 FL (ref 37–54)
EOSINOPHIL # BLD AUTO: 0.13 10*3/MM3 (ref 0–0.7)
EOSINOPHIL NFR BLD AUTO: 1.2 % (ref 0.3–6.2)
ERYTHROCYTE [DISTWIDTH] IN BLOOD BY AUTOMATED COUNT: 12.9 % (ref 11.5–14.5)
FLUAV H1 2009 PAND RNA NPH QL NAA+PROBE: NOT DETECTED
FLUAV H1 HA GENE NPH QL NAA+PROBE: NOT DETECTED
FLUAV H3 RNA NPH QL NAA+PROBE: NOT DETECTED
FLUAV SUBTYP SPEC NAA+PROBE: NOT DETECTED
FLUBV RNA ISLT QL NAA+PROBE: NOT DETECTED
GFR SERPL CREATININE-BSD FRML MDRD: 60 ML/MIN/1.73
GLUCOSE BLD-MCNC: 91 MG/DL (ref 65–99)
HADV DNA SPEC NAA+PROBE: NOT DETECTED
HCOV 229E RNA SPEC QL NAA+PROBE: NOT DETECTED
HCOV HKU1 RNA SPEC QL NAA+PROBE: NOT DETECTED
HCOV NL63 RNA SPEC QL NAA+PROBE: NOT DETECTED
HCOV OC43 RNA SPEC QL NAA+PROBE: NOT DETECTED
HCT VFR BLD AUTO: 39.4 % (ref 40.4–52.2)
HGB BLD-MCNC: 12.4 G/DL (ref 13.7–17.6)
HMPV RNA NPH QL NAA+NON-PROBE: NOT DETECTED
HPIV1 RNA SPEC QL NAA+PROBE: NOT DETECTED
HPIV2 RNA SPEC QL NAA+PROBE: NOT DETECTED
HPIV3 RNA NPH QL NAA+PROBE: NOT DETECTED
HPIV4 P GENE NPH QL NAA+PROBE: NOT DETECTED
IMM GRANULOCYTES # BLD: 0.02 10*3/MM3 (ref 0–0.03)
IMM GRANULOCYTES NFR BLD: 0.2 % (ref 0–0.5)
LYMPHOCYTES # BLD AUTO: 2.12 10*3/MM3 (ref 0.9–4.8)
LYMPHOCYTES NFR BLD AUTO: 19.1 % (ref 19.6–45.3)
M PNEUMO IGG SER IA-ACNC: NOT DETECTED
MCH RBC QN AUTO: 27.1 PG (ref 27–32.7)
MCHC RBC AUTO-ENTMCNC: 31.5 G/DL (ref 32.6–36.4)
MCV RBC AUTO: 86.2 FL (ref 79.8–96.2)
MONOCYTES # BLD AUTO: 0.89 10*3/MM3 (ref 0.2–1.2)
MONOCYTES NFR BLD AUTO: 8 % (ref 5–12)
NEUTROPHILS # BLD AUTO: 7.92 10*3/MM3 (ref 1.9–8.1)
NEUTROPHILS NFR BLD AUTO: 71.4 % (ref 42.7–76)
PLATELET # BLD AUTO: 210 10*3/MM3 (ref 140–500)
PMV BLD AUTO: 9.9 FL (ref 6–12)
POTASSIUM BLD-SCNC: 4.4 MMOL/L (ref 3.5–5.2)
RBC # BLD AUTO: 4.57 10*6/MM3 (ref 4.6–6)
RHINOVIRUS RNA SPEC NAA+PROBE: NOT DETECTED
RSV RNA NPH QL NAA+NON-PROBE: NOT DETECTED
SODIUM BLD-SCNC: 138 MMOL/L (ref 136–145)
WBC NRBC COR # BLD: 11.09 10*3/MM3 (ref 4.5–10.7)

## 2018-05-22 PROCEDURE — 76000 FLUOROSCOPY <1 HR PHYS/QHP: CPT

## 2018-05-22 PROCEDURE — 87581 M.PNEUMON DNA AMP PROBE: CPT | Performed by: HOSPITALIST

## 2018-05-22 PROCEDURE — 25010000002 PROPOFOL 10 MG/ML EMULSION: Performed by: NURSE ANESTHETIST, CERTIFIED REGISTERED

## 2018-05-22 PROCEDURE — 25010000002 ONDANSETRON PER 1 MG: Performed by: NURSE ANESTHETIST, CERTIFIED REGISTERED

## 2018-05-22 PROCEDURE — 87486 CHLMYD PNEUM DNA AMP PROBE: CPT | Performed by: HOSPITALIST

## 2018-05-22 PROCEDURE — 74018 RADEX ABDOMEN 1 VIEW: CPT

## 2018-05-22 PROCEDURE — 87633 RESP VIRUS 12-25 TARGETS: CPT | Performed by: HOSPITALIST

## 2018-05-22 PROCEDURE — C1758 CATHETER, URETERAL: HCPCS | Performed by: UROLOGY

## 2018-05-22 PROCEDURE — 87798 DETECT AGENT NOS DNA AMP: CPT | Performed by: HOSPITALIST

## 2018-05-22 PROCEDURE — 93010 ELECTROCARDIOGRAM REPORT: CPT | Performed by: INTERNAL MEDICINE

## 2018-05-22 PROCEDURE — 93005 ELECTROCARDIOGRAM TRACING: CPT | Performed by: HOSPITALIST

## 2018-05-22 PROCEDURE — 25010000002 FENTANYL CITRATE (PF) 100 MCG/2ML SOLUTION: Performed by: NURSE ANESTHETIST, CERTIFIED REGISTERED

## 2018-05-22 PROCEDURE — 80048 BASIC METABOLIC PNL TOTAL CA: CPT | Performed by: INTERNAL MEDICINE

## 2018-05-22 PROCEDURE — C2617 STENT, NON-COR, TEM W/O DEL: HCPCS | Performed by: UROLOGY

## 2018-05-22 PROCEDURE — 85025 COMPLETE CBC W/AUTO DIFF WBC: CPT | Performed by: INTERNAL MEDICINE

## 2018-05-22 PROCEDURE — 25010000002 FENTANYL CITRATE (PF) 100 MCG/2ML SOLUTION: Performed by: ANESTHESIOLOGY

## 2018-05-22 PROCEDURE — 0 IOTHALAMATE 60 % SOLUTION: Performed by: UROLOGY

## 2018-05-22 PROCEDURE — 25010000002 MORPHINE PER 10 MG: Performed by: INTERNAL MEDICINE

## 2018-05-22 PROCEDURE — C1769 GUIDE WIRE: HCPCS | Performed by: UROLOGY

## 2018-05-22 PROCEDURE — 0T778DZ DILATION OF LEFT URETER WITH INTRALUMINAL DEVICE, VIA NATURAL OR ARTIFICIAL OPENING ENDOSCOPIC: ICD-10-PCS | Performed by: UROLOGY

## 2018-05-22 PROCEDURE — 25010000002 DEXAMETHASONE PER 1 MG: Performed by: NURSE ANESTHETIST, CERTIFIED REGISTERED

## 2018-05-22 PROCEDURE — 25010000002 MIDAZOLAM PER 1 MG: Performed by: ANESTHESIOLOGY

## 2018-05-22 DEVICE — URETERAL STENT
Type: IMPLANTABLE DEVICE | Status: FUNCTIONAL
Brand: CONTOUR™

## 2018-05-22 RX ORDER — ONDANSETRON 2 MG/ML
4 INJECTION INTRAMUSCULAR; INTRAVENOUS EVERY 6 HOURS PRN
Status: DISCONTINUED | OUTPATIENT
Start: 2018-05-22 | End: 2018-05-23 | Stop reason: HOSPADM

## 2018-05-22 RX ORDER — MAGNESIUM HYDROXIDE 1200 MG/15ML
LIQUID ORAL AS NEEDED
Status: DISCONTINUED | OUTPATIENT
Start: 2018-05-22 | End: 2018-05-22 | Stop reason: HOSPADM

## 2018-05-22 RX ORDER — PROMETHAZINE HYDROCHLORIDE 25 MG/1
12.5 TABLET ORAL ONCE AS NEEDED
Status: DISCONTINUED | OUTPATIENT
Start: 2018-05-22 | End: 2018-05-22

## 2018-05-22 RX ORDER — ACETAMINOPHEN 325 MG/1
650 TABLET ORAL EVERY 6 HOURS PRN
Status: DISCONTINUED | OUTPATIENT
Start: 2018-05-22 | End: 2018-05-23 | Stop reason: HOSPADM

## 2018-05-22 RX ORDER — PROMETHAZINE HYDROCHLORIDE 25 MG/1
25 SUPPOSITORY RECTAL ONCE AS NEEDED
Status: DISCONTINUED | OUTPATIENT
Start: 2018-05-22 | End: 2018-05-22

## 2018-05-22 RX ORDER — PROPOFOL 10 MG/ML
VIAL (ML) INTRAVENOUS AS NEEDED
Status: DISCONTINUED | OUTPATIENT
Start: 2018-05-22 | End: 2018-05-22 | Stop reason: SURG

## 2018-05-22 RX ORDER — FENTANYL CITRATE 50 UG/ML
INJECTION, SOLUTION INTRAMUSCULAR; INTRAVENOUS AS NEEDED
Status: DISCONTINUED | OUTPATIENT
Start: 2018-05-22 | End: 2018-05-22 | Stop reason: SURG

## 2018-05-22 RX ORDER — PROMETHAZINE HYDROCHLORIDE 25 MG/ML
12.5 INJECTION, SOLUTION INTRAMUSCULAR; INTRAVENOUS ONCE AS NEEDED
Status: DISCONTINUED | OUTPATIENT
Start: 2018-05-22 | End: 2018-05-22

## 2018-05-22 RX ORDER — LABETALOL HYDROCHLORIDE 5 MG/ML
5 INJECTION, SOLUTION INTRAVENOUS
Status: DISCONTINUED | OUTPATIENT
Start: 2018-05-22 | End: 2018-05-22

## 2018-05-22 RX ORDER — MIDAZOLAM HYDROCHLORIDE 1 MG/ML
2 INJECTION INTRAMUSCULAR; INTRAVENOUS
Status: DISCONTINUED | OUTPATIENT
Start: 2018-05-22 | End: 2018-05-22 | Stop reason: HOSPADM

## 2018-05-22 RX ORDER — ONDANSETRON 2 MG/ML
INJECTION INTRAMUSCULAR; INTRAVENOUS AS NEEDED
Status: DISCONTINUED | OUTPATIENT
Start: 2018-05-22 | End: 2018-05-22 | Stop reason: SURG

## 2018-05-22 RX ORDER — MIDAZOLAM HYDROCHLORIDE 1 MG/ML
1 INJECTION INTRAMUSCULAR; INTRAVENOUS
Status: DISCONTINUED | OUTPATIENT
Start: 2018-05-22 | End: 2018-05-22 | Stop reason: HOSPADM

## 2018-05-22 RX ORDER — DIPHENHYDRAMINE HYDROCHLORIDE 50 MG/ML
12.5 INJECTION INTRAMUSCULAR; INTRAVENOUS
Status: DISCONTINUED | OUTPATIENT
Start: 2018-05-22 | End: 2018-05-22

## 2018-05-22 RX ORDER — LIDOCAINE HYDROCHLORIDE 10 MG/ML
0.5 INJECTION, SOLUTION EPIDURAL; INFILTRATION; INTRACAUDAL; PERINEURAL ONCE AS NEEDED
Status: DISCONTINUED | OUTPATIENT
Start: 2018-05-22 | End: 2018-05-22 | Stop reason: HOSPADM

## 2018-05-22 RX ORDER — SODIUM CHLORIDE, SODIUM LACTATE, POTASSIUM CHLORIDE, CALCIUM CHLORIDE 600; 310; 30; 20 MG/100ML; MG/100ML; MG/100ML; MG/100ML
9 INJECTION, SOLUTION INTRAVENOUS CONTINUOUS
Status: DISCONTINUED | OUTPATIENT
Start: 2018-05-22 | End: 2018-05-23

## 2018-05-22 RX ORDER — LIDOCAINE HYDROCHLORIDE 20 MG/ML
INJECTION, SOLUTION INFILTRATION; PERINEURAL AS NEEDED
Status: DISCONTINUED | OUTPATIENT
Start: 2018-05-22 | End: 2018-05-22 | Stop reason: SURG

## 2018-05-22 RX ORDER — FENTANYL CITRATE 50 UG/ML
50 INJECTION, SOLUTION INTRAMUSCULAR; INTRAVENOUS
Status: DISCONTINUED | OUTPATIENT
Start: 2018-05-22 | End: 2018-05-22 | Stop reason: HOSPADM

## 2018-05-22 RX ORDER — PROMETHAZINE HYDROCHLORIDE 25 MG/1
25 TABLET ORAL ONCE AS NEEDED
Status: DISCONTINUED | OUTPATIENT
Start: 2018-05-22 | End: 2018-05-22

## 2018-05-22 RX ORDER — HYDROCODONE BITARTRATE AND ACETAMINOPHEN 7.5; 325 MG/1; MG/1
1 TABLET ORAL ONCE AS NEEDED
Status: DISCONTINUED | OUTPATIENT
Start: 2018-05-22 | End: 2018-05-22

## 2018-05-22 RX ORDER — OXYCODONE AND ACETAMINOPHEN 7.5; 325 MG/1; MG/1
1 TABLET ORAL ONCE AS NEEDED
Status: COMPLETED | OUTPATIENT
Start: 2018-05-22 | End: 2018-05-22

## 2018-05-22 RX ORDER — DEXAMETHASONE SODIUM PHOSPHATE 10 MG/ML
INJECTION INTRAMUSCULAR; INTRAVENOUS AS NEEDED
Status: DISCONTINUED | OUTPATIENT
Start: 2018-05-22 | End: 2018-05-22 | Stop reason: SURG

## 2018-05-22 RX ORDER — FENTANYL CITRATE 50 UG/ML
50 INJECTION, SOLUTION INTRAMUSCULAR; INTRAVENOUS
Status: DISCONTINUED | OUTPATIENT
Start: 2018-05-22 | End: 2018-05-22

## 2018-05-22 RX ORDER — HYDROMORPHONE HYDROCHLORIDE 1 MG/ML
0.5 INJECTION, SOLUTION INTRAMUSCULAR; INTRAVENOUS; SUBCUTANEOUS
Status: DISCONTINUED | OUTPATIENT
Start: 2018-05-22 | End: 2018-05-22

## 2018-05-22 RX ORDER — OXYCODONE HYDROCHLORIDE AND ACETAMINOPHEN 5; 325 MG/1; MG/1
1 TABLET ORAL EVERY 4 HOURS PRN
Status: DISCONTINUED | OUTPATIENT
Start: 2018-05-22 | End: 2018-05-23 | Stop reason: HOSPADM

## 2018-05-22 RX ORDER — EPHEDRINE SULFATE 50 MG/ML
5 INJECTION, SOLUTION INTRAVENOUS ONCE AS NEEDED
Status: DISCONTINUED | OUTPATIENT
Start: 2018-05-22 | End: 2018-05-22

## 2018-05-22 RX ORDER — SODIUM CHLORIDE 0.9 % (FLUSH) 0.9 %
1-10 SYRINGE (ML) INJECTION AS NEEDED
Status: DISCONTINUED | OUTPATIENT
Start: 2018-05-22 | End: 2018-05-22 | Stop reason: HOSPADM

## 2018-05-22 RX ORDER — FAMOTIDINE 10 MG/ML
20 INJECTION, SOLUTION INTRAVENOUS ONCE
Status: COMPLETED | OUTPATIENT
Start: 2018-05-22 | End: 2018-05-22

## 2018-05-22 RX ORDER — PHENAZOPYRIDINE HYDROCHLORIDE 200 MG/1
200 TABLET, FILM COATED ORAL 3 TIMES DAILY PRN
Status: DISCONTINUED | OUTPATIENT
Start: 2018-05-22 | End: 2018-05-23 | Stop reason: HOSPADM

## 2018-05-22 RX ADMIN — FENTANYL CITRATE 100 MCG: 50 INJECTION INTRAMUSCULAR; INTRAVENOUS at 13:01

## 2018-05-22 RX ADMIN — DEXAMETHASONE SODIUM PHOSPHATE 8 MG: 10 INJECTION INTRAMUSCULAR; INTRAVENOUS at 13:12

## 2018-05-22 RX ADMIN — FENTANYL CITRATE 50 MCG: 50 INJECTION, SOLUTION INTRAMUSCULAR; INTRAVENOUS at 11:30

## 2018-05-22 RX ADMIN — FAMOTIDINE 20 MG: 10 INJECTION, SOLUTION INTRAVENOUS at 11:30

## 2018-05-22 RX ADMIN — MORPHINE SULFATE 2 MG: 4 INJECTION INTRAVENOUS at 09:24

## 2018-05-22 RX ADMIN — SODIUM CHLORIDE 125 ML/HR: 9 INJECTION, SOLUTION INTRAVENOUS at 23:36

## 2018-05-22 RX ADMIN — MORPHINE SULFATE 2 MG: 4 INJECTION INTRAVENOUS at 03:27

## 2018-05-22 RX ADMIN — MIDAZOLAM 2 MG: 1 INJECTION INTRAMUSCULAR; INTRAVENOUS at 11:30

## 2018-05-22 RX ADMIN — PROPOFOL 180 MG: 10 INJECTION, EMULSION INTRAVENOUS at 13:01

## 2018-05-22 RX ADMIN — FENTANYL CITRATE 50 MCG: 50 INJECTION INTRAMUSCULAR; INTRAVENOUS at 14:40

## 2018-05-22 RX ADMIN — ONDANSETRON 4 MG: 2 INJECTION INTRAMUSCULAR; INTRAVENOUS at 13:12

## 2018-05-22 RX ADMIN — OXYCODONE HYDROCHLORIDE AND ACETAMINOPHEN 1 TABLET: 5; 325 TABLET ORAL at 20:16

## 2018-05-22 RX ADMIN — LIDOCAINE HYDROCHLORIDE 60 MG: 20 INJECTION, SOLUTION INFILTRATION; PERINEURAL at 13:01

## 2018-05-22 RX ADMIN — SODIUM CHLORIDE 125 ML/HR: 9 INJECTION, SOLUTION INTRAVENOUS at 07:25

## 2018-05-22 RX ADMIN — SODIUM CHLORIDE, POTASSIUM CHLORIDE, SODIUM LACTATE AND CALCIUM CHLORIDE 9 ML/HR: 600; 310; 30; 20 INJECTION, SOLUTION INTRAVENOUS at 11:30

## 2018-05-22 RX ADMIN — OXYCODONE HYDROCHLORIDE AND ACETAMINOPHEN 1 TABLET: 7.5; 325 TABLET ORAL at 14:41

## 2018-05-22 NOTE — PLAN OF CARE
Problem: Patient Care Overview  Goal: Plan of Care Review  Outcome: Ongoing (interventions implemented as appropriate)   05/22/18 0601   Coping/Psychosocial   Plan of Care Reviewed With patient   Plan of Care Review   Progress no change   OTHER   Outcome Summary Pt admitted at 01:00 for elevated temp , pt c/o left flank pain, Morphine 2mg was given. Pt is scheduled for lithotripsy procudure today (May 22th). NPO at midnight. VSS. Wife at bedside. Will continue to monitor     Goal: Interprofessional Rounds/Family Conf  Outcome: Ongoing (interventions implemented as appropriate)      Problem: Infection, Risk/Actual (Adult)  Goal: Identify Related Risk Factors and Signs and Symptoms  Outcome: Ongoing (interventions implemented as appropriate)    Goal: Infection Prevention/Resolution  Outcome: Ongoing (interventions implemented as appropriate)      Problem: Pain, Acute (Adult)  Goal: Identify Related Risk Factors and Signs and Symptoms  Outcome: Ongoing (interventions implemented as appropriate)    Goal: Acceptable Pain Control/Comfort Level  Outcome: Ongoing (interventions implemented as appropriate)

## 2018-05-22 NOTE — ANESTHESIA POSTPROCEDURE EVALUATION
Patient: Walker Aldana    Procedure Summary     Date:  05/22/18 Room / Location:  [facility] OR [address] [place] [address] MAIN OR    Anesthesia Start:  1258 Anesthesia Stop:  1343    Procedure:  CYSTOSCOPY URETERAL CATHETER/STENT INSERTION (Left ) Diagnosis:      Surgeon:  Baron Graham MD Provider:  Joni Estevez MD    Anesthesia Type:  general ASA Status:  2          Anesthesia Type: general  Last vitals  BP   123/66 (05/22/18 1355)   Temp   37.1 °C (98.8 °F) (05/22/18 1339)   Pulse   70 (05/22/18 1355)   Resp   16 (05/22/18 1355)     SpO2   100 % (05/22/18 1355)     Post Anesthesia Care and Evaluation    Patient location during evaluation: PACU  Patient participation: complete - patient participated  Level of consciousness: awake and alert  Pain management: adequate  Airway patency: patent  Anesthetic complications: No anesthetic complications  PONV Status: none  Cardiovascular status: acceptable  Respiratory status: acceptable  Hydration status: acceptable

## 2018-05-22 NOTE — ED TRIAGE NOTES
Patient states he was diagnosed with kidney stones, he has been having fevers on and off and Dr. Alfonso told patient if his fever kept going up he needed to come into the ER

## 2018-05-22 NOTE — PAYOR COMM NOTE
"Vickie Aldana (52 y.o. Male)     PLEASE SEE ATTACHED CLINICAL REVIEW. PT. WAS ADMITTED TO [facility] ON 18.    REF#[id number]    PLEASE CALL   OR  [phone] WITH INPT AUTH AND DAYS APPROVED. [facility] IS DRG WITH [facility].     THANK YOU    BIJU CUEVAS LPN, CCP    Date of Birth Social Security Number Address Home Phone MRN    1965  [id number] [address] [phone] [id number]    [employer] Marital Status          Roman [employer]        Admission Date Admission Type Admitting Provider Attending Provider Department, Room/Bed    18 Emergency Justice Fischer MD Nguyen, Minh Loc, MD [facility] MAIN OR, [place] Main OR/MAIN OR    Discharge Date Discharge Disposition Discharge Destination                       Attending Provider:  Justice Fischer MD    Allergies:  Bupropion    Isolation:  None   Infection:  None   Code Status:  Not on file    Ht:  193 cm (76\")   Wt:  118 kg (260 lb)    Admission Cmt:  None   Principal Problem:  Nephrolithiasis [N20.0]                 Active Insurance as of 2018     Primary Coverage     Payor Plan Insurance Group Employer/Plan Group    [employer] BLUE [employer]OSS [employer] BLUE CROSS BLUE SHIELD PPO [id number]     Payor Plan Address Payor Plan Phone Number Effective From Effective To    [address] [phone] 2016     [facility] [id number]       Subscriber Name Subscriber Birth Date Member ID       VICKIE ALDANA 1965 [id number]                 Emergency Contacts      (Rel.) Home Phone Work Phone Mobile Phone    Dali Aldana (Spouse) [phone] -- --               History & Physical      Justice Fischer MD at 2018  9:22 AM                              [facility]    Patient Identification:  Name: Vickie Aldana  Age: 52 y.o.  Sex: male  :  1965  MRN: [id number]         Primary Care Physician: JUVENTINO Giraldo MD    Chief Complaint   Patient " presents with   • Flank Pain   • Fatigue   • Fever     Subjective   Patient is a 52 y.o. male with a history of kidney stones in his 20s was in the emergency department on 5/17/18 diagnosed with a kidney stone. He presented with left flank pain. He had mild hydronephrosis on CT scan with perinephric stranding. Urology saw the patient and recommended outpatient lithotripsy. The pain he describes it as left flank sharp stabbing. Pain medication may have helped the pain but it made it so that he was not able to sleep very well. He states he cannot get a deep sleep. He has had fevers and chills. A couple of days ago he had sore throat. His daughter is sick with a similar illness with a sore throat. He has a mild cough. No rhinorrhea. No chest pain. No shortness of breath. He did feel a little bit anxious trying to go to bed last couple of nights. The left-sided flank pain seemed to be worsening with sore throat and he had fever and presented to the emergency department. He has had nausea and decreased appetite.    Past Medical History:   Diagnosis Date   • Arthritis    • Kidney stones      Past Surgical History:   Procedure Laterality Date   • ADENOIDECTOMY     • APPENDECTOMY     • JOINT REPLACEMENT     • KNEE ARTHROSCOPY Bilateral    • KNEE SURGERY Bilateral    • TONSILLECTOMY     • TOTAL HIP ARTHROPLASTY Left      Current medications reviewed.    History reviewed. No pertinent family history.  Social History   Substance Use Topics   • Smoking status: Current Every Day Smoker     Packs/day: 1.00   • Smokeless tobacco: Not on file   • Alcohol use Yes      Comment: socially     Review of Systems   Constitutional: Positive for appetite change and fever. Negative for chills.   HENT: Positive for sore throat. Negative for congestion, drooling and rhinorrhea.    Eyes: Negative.    Respiratory: Negative.  Negative for chest tightness and shortness of breath.    Cardiovascular: Negative.  Negative for chest pain.    Gastrointestinal: Positive for nausea. Negative for abdominal pain, diarrhea and vomiting.   Endocrine: Negative.    Genitourinary: Positive for flank pain.   Skin: Negative.    Allergic/Immunologic: Negative.    Neurological: Negative.    Hematological: Negative.    Psychiatric/Behavioral: Negative.       Objective      Vital Signs  Temp:  [97.4 °F (36.3 °C)-100.7 °F (38.2 °C)] 98.8 °F (37.1 °C)  Heart Rate:  [] 67  Resp:  [16-20] 16  BP: (128-192)/(74-95) 137/79  Body mass index is 31.65 kg/m².    Physical Exam   Constitutional: He is oriented to person, place, and time. He appears well-developed and well-nourished.  Non-toxic appearance. He does not have a sickly appearance. He does not appear ill. No distress.   HENT:   Head: Normocephalic and atraumatic.   Mouth/Throat: Oropharyngeal exudate (white) and posterior oropharyngeal erythema present.   Eyes: Conjunctivae and EOM are normal.   Neck: Phonation normal. Neck supple. No tracheal deviation and normal range of motion present.   Cardiovascular: Normal rate, regular rhythm and intact distal pulses.    Pulses:       Radial pulses are 2+ on the right side, and 2+ on the left side.        Dorsalis pedis pulses are 2+ on the right side, and 2+ on the left side.   Pulmonary/Chest: Effort normal and breath sounds normal. No stridor. No respiratory distress. He has no wheezes. He has no rales.   Abdominal: Soft. He exhibits no distension. There is no tenderness. There is no rebound and no guarding.   Musculoskeletal: He exhibits no edema.   Lymphadenopathy:     He has cervical adenopathy.   Neurological: He is alert and oriented to person, place, and time.   Skin: Skin is warm and dry.   Psychiatric: He has a normal mood and affect. His behavior is normal.       Estimated Creatinine Clearance: 82.8 mL/min (A) (by C-G formula based on SCr of 1.5 mg/dL (H)).    Assessment/Plan   Active Hospital Problems (** Indicates Principal Problem)    Diagnosis Date Noted    • **Nephrolithiasis [N20.0] 05/21/2018   • Exudative pharyngitis [J02.9] 05/22/2018   • WOJCIECH (acute kidney injury) [N17.9] 05/22/2018   • Hydronephrosis [N13.30] 05/22/2018   • Leukocytosis [D72.829] 05/22/2018   • Tobacco abuse [Z72.0] 05/22/2018      Resolved Hospital Problems    Diagnosis Date Noted Date Resolved   No resolved problems to display.     · 52 y.o. male with a ureteral stone with pyuria and fever and exudative pharyngitis. Strep swab negative. Lactate normal. Leukocytosis has improved from yesterday.  · Antibiotics and supportive care  · SCDs  · analgesia. switch to percocet since lortab possible side effect. IV pain medication when necessary  · Urology evaluated the patient plans on semi-emergent stent placement today  · acute kidney injury possible from UTI/hydronephrosis. IVF, nephrology consult.  · reviewed records from 5/17/18  · Discussed with patient smoking cessation. He plans to quit.  · I discussed the patient's findings and my recommendations with patient, family and nursing staff.    Justice Fischer MD  05/22/18  9:52 AM      Electronically signed by Justice Fischer MD at 5/22/2018  9:52 AM          Emergency Department Notes      Hillary Mooney RN at 5/21/2018  8:05 PM        Patient states he was diagnosed with kidney stones, he has been having fevers on and off and Dr. Alfonso told patient if his fever kept going up he needed to come into the ER    Electronically signed by Hillary Mooney RN at 5/21/2018  8:06 PM     Hillary Mooney RN at 5/21/2018  8:07 PM        Patient states he was seen here on Thursday and is suppose to see Dr. Alfonso tomorrow morning.     Electronically signed by Hillary Mooney RN at 5/21/2018  8:08 PM     Kota Drake MD at 5/21/2018  9:49 PM          [facility] EMERGENCY DEPARTMENT ENCOUNTER    CHIEF COMPLAINT  Chief Complaint: Fever  History given by: pt/ family is present at bedside  History limited by: N/A  Room Number: 50/50  PMD: JUVENTINO Giraldo  MD      HPI:  Pt is a 52 y.o. male who presents complaining of constant fever that began 2 days ago with worsening today. Pt also c/o nausea, worsening L sided flank pain, worsening back pain, and sore throat, but but denies vomiting or any other pertinent symptoms. Pt was scheduled for a lithotripsy procedure tomorrow for a known renal calculus. Pt was evaluated in the ER on 5/17/18, but returns today due to fever [Tmax of 101-102 degrees F]. Pt denies being around anyone who has been recently sick. Pt has had a previous tonsillectomy and adenoidectomy. Pt is currently taking hydrocodone with no reported relief.  The patient's sore throat started 2 days ago and was gradual in onset.    Duration:  2 days   Onset: gradual  Timing: constant   Location: N/A  Radiation: N/A  Quality: fever [Tmax of 101-102 degrees F]  Intensity/Severity: moderate  Progression: worsening   Associated Symptoms: nausea, worsening L flank pain, worsening back pain, sore throat   Aggravating Factors: None reported  Alleviating Factors: None reported   Previous Episodes: Pt was recently diagnosed with a renal calculus and scheduled for surgery tomorrow.  Treatment before arrival: Pt is currently taking hydrocodone with no relief.     PAST MEDICAL HISTORY  Active Ambulatory Problems     Diagnosis Date Noted   • No Active Ambulatory Problems     Resolved Ambulatory Problems     Diagnosis Date Noted   • No Resolved Ambulatory Problems     Past Medical History:   Diagnosis Date   • Arthritis    • Kidney stones        PAST SURGICAL HISTORY  Past Surgical History:   Procedure Laterality Date   • ADENOIDECTOMY     • APPENDECTOMY     • JOINT REPLACEMENT     • KNEE ARTHROSCOPY Bilateral    • KNEE SURGERY Bilateral    • TONSILLECTOMY     • TOTAL HIP ARTHROPLASTY Left        FAMILY HISTORY  History reviewed. No pertinent family history.    SOCIAL HISTORY  Social History     Social History   • Marital status:      Spouse name: N/A   • Number of  children: N/A   • Years of education: N/A     Occupational History   • Not on file.     Social History Main Topics   • Smoking status: Current Every Day Smoker     Packs/day: 1.00   • Smokeless tobacco: Not on file   • Alcohol use Yes      Comment: socially   • Drug use: No   • Sexual activity: Defer     Other Topics Concern   • Not on file     Social History Narrative   • No narrative on file       ALLERGIES  Patient has no known allergies.    REVIEW OF SYSTEMS  Review of Systems   Constitutional: Positive for fever. Negative for chills.   HENT: Positive for sore throat.    Eyes: Negative.    Respiratory: Negative.  Negative for cough.    Cardiovascular: Negative.  Negative for chest pain.   Gastrointestinal: Positive for nausea. Negative for vomiting.   Genitourinary: Positive for flank pain (worsening L sided ). Negative for dysuria.   Musculoskeletal: Positive for back pain (worsening ).   Skin: Negative.  Negative for rash.   Neurological: Negative.  Negative for headaches.       PHYSICAL EXAM  ED Triage Vitals   Temp Heart Rate Resp BP SpO2   05/21/18 1936 05/21/18 1936 05/21/18 2007 05/21/18 2007 05/21/18 1936   (!) 100.7 °F (38.2 °C) 114 20 153/86 97 %      Temp src Heart Rate Source Patient Position BP Location FiO2 (%)   05/21/18 1936 -- -- -- --   Tympanic           Physical Exam   Constitutional: He is oriented to person, place, and time and well-developed, well-nourished, and in no distress.   The patient is not septic or toxic appearing.   HENT:   Head: Normocephalic and atraumatic.   Mouth/Throat: Oropharyngeal exudate (white exudate to uvula ) and posterior oropharyngeal erythema present. No tonsillar abscesses.   No trismus   Voice is normal  Pt is maintaining his airway without any difficulty and without any stridor or airway compromise.  The patient is handling his secretions without any difficulty.  There is no swelling under his tongue was well as his anterior neck.   Eyes: EOM are normal.  Pupils are equal, round, and reactive to light.   Neck: Normal range of motion. Neck supple.   Cardiovascular: Normal rate, regular rhythm, normal heart sounds and intact distal pulses.    Pulmonary/Chest: Effort normal and breath sounds normal. No respiratory distress.   Abdominal: Soft. Bowel sounds are normal. He exhibits no distension. There is no tenderness. There is no rebound and no guarding.   The patient does have some left CVA tenderness subjectively on exam.   Musculoskeletal: Normal range of motion. He exhibits no edema.   Lymphadenopathy:     He has cervical adenopathy (L sided at angle of jaw ).   Neurological: He is alert and oriented to person, place, and time. He has normal sensation and normal strength.   Skin: Skin is warm and dry.   Psychiatric: Mood and affect normal.   Nursing note and vitals reviewed.    I ordered the above labs and reviewed the results    PROCEDURES  Procedures      PROGRESS AND CONSULTS     2007  Ordered IVF and labs for further evaluation.     2148  Ordered Rocephin for infection.     2202  Ordered Tylenol for fever. Placed consult to Urology.     2228  Discussed pt's case with Dr. Camarena [Urology] who agrees to consult, but would like medicine to admit.     2235  Placed consult to [facility].     2238  Rechecked pt who is in no acute distress. Discussed lab results with pt; strep test is negative.   ---Reassessment: Pt is non-toxic/ non-septic appearing.     2242  Discussed pt's case with Dr. Lay [[facility]] who agrees to admit.     2245  Ordered IVF bolus and Dilaudid for pain.     MEDICAL DECISION MAKING  Results were reviewed/discussed with the patient and they were also made aware of online access. Pt also made aware that some labs, such as cultures, will not be resulted during ER visit and follow up with PMD is necessary.     MDM  Number of Diagnoses or Management Options     Amount and/or Complexity of Data Reviewed  Clinical lab tests: reviewed and ordered (WBC =  14.03  Negative strep screen )  Discussion of test results with the performing providers: yes (Dr. Camarena (Urology)  Dr. Lay ([facility]) )  Decide to obtain previous medical records or to obtain history from someone other than the patient: yes  Review and summarize past medical records: yes (Pt was evaluated on 5/17/18 and diagnosed with renal calculus with stranding. )  Independent visualization of images, tracings, or specimens: yes           DIAGNOSIS  Final diagnoses:   Nephrolithiasis   Acute renal failure, unspecified acute renal failure type   Leukocytosis, unspecified type   Exudative pharyngitis       DISPOSITION  ADMISSION    Discussed treatment plan and reason for admission with pt/family and admitting physician.  Pt/family voiced understanding of the plan for admission for further testing/treatment as needed.     Latest Documented Vital Signs:  As of 10:48 PM  BP- (!) 192/83 HR- 95 Temp- (!) 100.7 °F (38.2 °C) (Tympanic) O2 sat- 97%    --  Documentation assistance provided by anthony Burgess for Dr. Drake.  Information recorded by the scribe was done at my direction and has been verified and validated by me.     Edgardo Burgess  05/21/18 8186       Kota Drake MD  05/21/18 2334      Electronically signed by Kota Drake MD at 5/21/2018 11:32 PM     Arelis Dozier RN at 5/21/2018 11:12 PM        Report given to Josephine Dozier RN  05/21/18 9319      Electronically signed by Arelis Dozier RN at 5/21/2018 11:13 PM     Josephine Cam RN at 5/22/2018 12:12 AM        Awaiting transport at this time. NAD noted. Pt informed.     Josephine Cam RN  05/22/18 0013      Electronically signed by Josephine Cam RN at 5/22/2018 12:13 AM       Intake & Output (last day)       05/21 0701 - 05/22 0700 05/22 0701 - 05/23 0700    I.V. (mL/kg)  1000 (8.5)    IV Piggyback 1050     Total Intake(mL/kg) 1050 (8.9) 1000 (8.5)    Net +1050 +1000               Lab Results (all)     Procedure Component Value Units Date/Time    Respiratory Panel, PCR - Swab, Nasopharynx [[id number]]  (Normal) Collected:  05/22/18 1001    Specimen:  Swab from Nasopharynx Updated:  05/22/18 1215     ADENOVIRUS, PCR Not Detected     Coronavirus 229E Not Detected     Coronavirus HKU1 Not Detected     Coronavirus NL63 Not Detected     Coronavirus OC43 Not Detected     Human Metapneumovirus Not Detected     Human Rhinovirus/Enterovirus Not Detected     Influenza B PCR Not Detected     Parainfluenza Virus 1 Not Detected     Parainfluenza Virus 2 Not Detected     Parainfluenza Virus 3 Not Detected     Parainfluenza Virus 4 Not Detected     Bordetella pertussis pcr Not Detected     Influenza A H1 2009 PCR Not Detected     Chlamydophila pneumoniae PCR Not Detected     Mycoplasma pneumo by PCR Not Detected     Influenza A PCR Not Detected     Influenza A H3 Not Detected     Influenza A H1 Not Detected     RSV, PCR Not Detected    Beta Strep Culture, Throat - Swab, Throat [[id number]]  (Normal) Collected:  05/21/18 2206    Specimen:  Swab from Throat Updated:  05/22/18 1212     Throat Culture, Beta Strep No Beta Hemolytic Streptococcus Isolated    Narrative:         Group A Strep incidence is low in adults. Positive culture for Beta hemolytic Streptococcus species can reflect colonization and not true infection. Please correlate clinically.    Blood Culture - Blood, [[id number]]  (Normal) Collected:  05/21/18 2206    Specimen:  Blood from Arm, Left Updated:  05/22/18 1030     Blood Culture No growth at less than 24 hours    Blood Culture - Blood, [[id number]]  (Normal) Collected:  05/21/18 2206    Specimen:  Blood from Arm, Right Updated:  05/22/18 1030     Blood Culture No growth at less than 24 hours    Basic Metabolic Panel [[id number]]  (Abnormal) Collected:  05/22/18 0516    Specimen:  Blood Updated:  05/22/18 0733     Glucose 91 mg/dL      BUN 14 mg/dL      Creatinine 1.50 (H) mg/dL      Sodium  138 mmol/L      Potassium 4.4 mmol/L      Chloride 99 mmol/L      CO2 26.2 mmol/L      Calcium 8.8 mg/dL      eGFR  African Amer 60 (L) mL/min/1.73      BUN/Creatinine Ratio 9.3     Anion Gap 12.8 mmol/L     Narrative:       CBC & Differential [[id number]] Collected:  05/22/18 0516    Specimen:  Blood Updated:  05/22/18 0620    Narrative:       CBC Auto Differential [[id number]]  (Abnormal) Collected:  05/22/18 0516    Specimen:  Blood Updated:  05/22/18 0620     WBC 11.09 (H) 10*3/mm3      RBC 4.57 (L) 10*6/mm3      Hemoglobin 12.4 (L) g/dL      Hematocrit 39.4 (L) %      MCV 86.2 fL      MCH 27.1 pg      MCHC 31.5 (L) g/dL      RDW 12.9 %      RDW-SD 40.8 fl      MPV 9.9 fL      Platelets 210 10*3/mm3      Neutrophil % 71.4 %      Lymphocyte % 19.1 (L) %      Monocyte % 8.0 %      Eosinophil % 1.2 %      Basophil % 0.1 %      Immature Grans % 0.2 %      Neutrophils, Absolute 7.92 10*3/mm3      Lymphocytes, Absolute 2.12 10*3/mm3      Monocytes, Absolute 0.89 10*3/mm3      Eosinophils, Absolute 0.13 10*3/mm3      Basophils, Absolute 0.01 10*3/mm3      Immature Grans, Absolute 0.02 10*3/mm3     Lactic Acid, Plasma [[id number]]  (Normal) Collected:  05/21/18 2206    Specimen:  Blood Updated:  05/21/18 2238     Lactate 0.6 mmol/L     Rapid Strep A Screen - Swab, Throat [[id number]]  (Normal) Collected:  05/21/18 2206    Specimen:  Swab from Throat Updated:  05/21/18 2232     Strep A Ag Negative    Comprehensive Metabolic Panel [[id number]]  (Abnormal) Collected:  05/21/18 2137    Specimen:  Blood Updated:  05/21/18 2212     Glucose 111 (H) mg/dL      BUN 14 mg/dL      Creatinine 1.47 (H) mg/dL      Sodium 136 mmol/L      Potassium 4.2 mmol/L      Chloride 97 (L) mmol/L      CO2 25.2 mmol/L      Calcium 9.2 mg/dL      Total Protein 7.4 g/dL      Albumin 3.90 g/dL      ALT (SGPT) 11 U/L      AST (SGOT) 9 U/L      Alkaline Phosphatase 57 U/L      Total Bilirubin 0.5 mg/dL      eGFR   Amer 61 mL/min/1.73      Globulin  3.5 gm/dL      A/G Ratio 1.1 g/dL      BUN/Creatinine Ratio 9.5     Anion Gap 13.8 mmol/L     Lipase [[id number]]  (Abnormal) Collected:  05/21/18 2137    Specimen:  Blood Updated:  05/21/18 2211     Lipase 10 (L) U/L     CBC & Differential [[id number]] Collected:  05/21/18 2137    Specimen:  Blood Updated:  05/21/18 2152    Narrative:       CBC Auto Differential [[id number]]  (Abnormal) Collected:  05/21/18 2137    Specimen:  Blood Updated:  05/21/18 2152     WBC 14.03 (H) 10*3/mm3      RBC 5.01 10*6/mm3      Hemoglobin 13.8 g/dL      Hematocrit 42.2 %      MCV 84.2 fL      MCH 27.5 pg      MCHC 32.7 g/dL      RDW 12.9 %      RDW-SD 39.3 fl      MPV 9.6 fL      Platelets 217 10*3/mm3      Neutrophil % 81.0 (H) %      Lymphocyte % 12.0 (L) %      Monocyte % 6.1 %      Eosinophil % 0.8 %      Basophil % 0.1 %      Immature Grans % 0.2 %      Neutrophils, Absolute 11.36 (H) 10*3/mm3      Lymphocytes, Absolute 1.69 10*3/mm3      Monocytes, Absolute 0.86 10*3/mm3      Eosinophils, Absolute 0.11 10*3/mm3      Basophils, Absolute 0.01 10*3/mm3      Immature Grans, Absolute 0.03 10*3/mm3     Urinalysis, Microscopic Only - Urine, Clean Catch [[id number]]  (Abnormal) Collected:  05/21/18 2141    Specimen:  Urine from Urine, Clean Catch Updated:  05/21/18 2151     RBC, UA 13-20 (A) /HPF      WBC, UA 6-12 (A) /HPF      Bacteria, UA None Seen /HPF      Squamous Epithelial Cells, UA 0-2 /HPF      Hyaline Casts, UA 0-2 /LPF      Methodology Automated Microscopy    Urinalysis With / Culture If Indicated - Urine, Clean Catch [[id number]]  (Abnormal) Collected:  05/21/18 2141    Specimen:  Urine from Urine, Clean Catch Updated:  05/21/18 2151     Color, UA Yellow     Appearance, UA Clear     pH, UA 5.5     Specific Gravity, UA 1.021     Glucose, UA Negative     Ketones, UA Negative     Bilirubin, UA Negative     Blood, UA Small (1+) (A)     Protein, UA Negative     Leuk Esterase, UA Small (1+) (A)     Nitrite, UA Negative      Urobilinogen, UA 1.0 E.U./dL    Urine Culture - Urine, Urine, Clean Catch [[id number]] Collected:  05/21/18 2141    Specimen:  Urine from Urine, Clean Catch Updated:  05/21/18 2151          Orders (all)     Start     Ordered    05/23/18 0600  CBC (No Diff)  Morning Draw      05/22/18 0949    05/23/18 0600  Basic Metabolic Panel  Morning Draw      05/22/18 0949    05/22/18 1311  iothalamate (CONRAY) injection  As Needed      05/22/18 1311    05/22/18 1311  sterile water irrigation solution  As Needed      05/22/18 1311    05/22/18 1117  May take Beta Blocker from home with sip of water.  Once     Comments:  Only applicable to patients on home Beta Blocker    05/22/18 1116    05/22/18 1117  Pulse Oximetry, Continuous  Continuous      05/22/18 1116    05/22/18 1117  POC Glucose Once  Once     Comments:  For all diabetic patients. Notify Anesthesiologist for blood sugar greater than 180 or less than 60..      05/22/18 1116    05/22/18 0950  Place Sequential Compression Device  Once,   Status:  Canceled      05/22/18 0949    05/22/18 0950  Maintain Sequential Compression Device  Continuous,   Status:  Canceled      05/22/18 0949    05/22/18 0950  VTE Risk Assessment - Moderate Risk  Once      05/22/18 0950    05/22/18 0950  Pharmacologic VTE Prophylaxis Not Indicated: Anticipated Invasive Procedure / Surgery  Once      05/22/18 0950    05/22/18 0950  Place Sequential Compression Device  Once      05/22/18 0950    05/22/18 0950  Maintain Sequential Compression Device  Continuous      05/22/18 0950    05/22/18 0948  Respiratory Panel, PCR - Swab, Nasopharynx  Once      05/22/18 0947    05/22/18 0948  Inpatient Nephrology Consult  Once     Specialty:  Nephrology  Provider:  Sidney Payne MD    05/22/18 0947    05/22/18 0123  Place ARI Hose  Once      05/22/18 0123    05/22/18 0123  Place Sequential Compression Device  Once      05/22/18 0123    05/22/18 0123  NPO Diet NPO Except: Sips With Meds  Diet Effective Now       18 0123    18 0122  [MAR Hold]  ondansetron (ZOFRAN) injection 4 mg  Every 6 Hours PRN     (MAR Hold since 18 1100)    18 0123    18 0122  [MAR Hold]  acetaminophen (TYLENOL) tablet 650 mg  Every 6 Hours PRN     (MAR Hold since 18 1100)    18 0123    18 0122  [MAR Hold]  morphine injection 2 mg  Every 3 Hours PRN     (MAR Hold since 18 1100)    18 0123    18 0122  Inpatient Urology Consult  Once     Specialty:  Urology  Provider:  Dereck Camarena Jr., MD    18 231  NPO Diet  Diet Effective Now,   Status:  Canceled      18 224  Tele Bed Request  Once      18  Inpatient Admission  Once      18  [facility] (on-call MD unless specified)  Once     Specialty:  Internal Medicine  Provider:  (Not yet assigned)    18 [id number] 220  Urology (on-call MD unless specified)  Once     Specialty:  Urology  Provider:  (Not yet assigned)    18    Unscheduled  Oxygen Therapy- Nasal Cannula; Titrate for SPO2: Per Policy  Continuous PRN      18 1116               Consult Notes (all)      Sidney Payne MD at 2018 12:44 PM      Consult Orders:    1. Inpatient Nephrology Consult [[id number]] ordered by Justice Fischer MD at 18 0947                                                                                                                [facility]                                                                                             Nephrology Initial Consult Note    Patient Identification:  Name: Walker Aldana MRN: [id number]  Age: 52 y.o. : 1965  Sex: male  Date:2018    Requesting Physician: As per consult order.  Reason for Consultation: Acute renal failure  Information from:patient/ family/ chart      History of Present Illness: This is a 52 y.o. year old male  with no significant medical  history other than one prior kidney stone, he has no hypertension, diabetes or any other chronic medical issues for which she takes medication.  He has been taking regular doses of NSAIDs, ibuprofen and Mobic for chronic knee osteoarthritis.  He was seen in the emergency department with increasing pain, 8 mm stone was noted in the left ureter with some mild hydronephrosis, fevers to 101.7 at home, possible urinary tract infection and evidence of acute kidney injury.  He was admitted and plans for stent placement at this time in the OR.  Other complaints include sore throat associated with his fevers, some nausea and decreased appetite.    Symptoms are improved by pain medication, worsened by exertion.  Quality of symptoms top flank pain, with growing radiation, severity of symptoms moderate to severe, timing described as gradually worsening over last several days since 5/17  Patient denies any nausea or  vomiting, no diarrhea or constipation, + fevers and chills, no shortness of breath, no chest pain.     The following medical history and medications personally reviewed by me:    Problem List:   Patient Active Problem List    Diagnosis   • Exudative pharyngitis [J02.9]   • WOJCIECH (acute kidney injury) [N17.9]   • Hydronephrosis [N13.30]   • Leukocytosis [D72.829]   • Tobacco abuse [Z72.0]   • Nephrolithiasis [N20.0]       Past Medical History:  Past Medical History:   Diagnosis Date   • Arthritis    • Kidney stones        Past Surgical History:  Past Surgical History:   Procedure Laterality Date   • ADENOIDECTOMY     • APPENDECTOMY     • JOINT REPLACEMENT     • KNEE ARTHROSCOPY Bilateral    • KNEE SURGERY Bilateral    • TONSILLECTOMY     • TOTAL HIP ARTHROPLASTY Left         Home Meds:   No prescriptions prior to admission.       Current Meds:   Current Facility-Administered Medications   Medication Dose Route Frequency Provider Last Rate Last Dose   • [MAR Hold] acetaminophen (TYLENOL) tablet 650 mg  650 mg Oral Q6H  PRN Baron Lay MD       • fentaNYL citrate (PF) (SUBLIMAZE) injection 50 mcg  50 mcg Intravenous Q10 Min PRN Robb Goldstein MD   50 mcg at 05/22/18 1130   • lactated ringers infusion  9 mL/hr Intravenous Continuous Robb Goldstein MD 9 mL/hr at 05/22/18 1130 9 mL/hr at 05/22/18 1130   • lidocaine PF 1% (XYLOCAINE) injection 0.5 mL  0.5 mL Injection Once PRN Robb Goldstein MD       • midazolam (VERSED) injection 1 mg  1 mg Intravenous Q5 Min PRN Robb Goldstein MD        Or   • midazolam (VERSED) injection 2 mg  2 mg Intravenous Q5 Min PRN Robb Goldstein MD   2 mg at 05/22/18 1130   • [MAR Hold] morphine injection 2 mg  2 mg Intravenous Q3H PRN Baron Lay MD   2 mg at 05/22/18 0924   • [MAR Hold] ondansetron (ZOFRAN) injection 4 mg  4 mg Intravenous Q6H PRN Baron Lay MD       • [MAR Hold] oxyCODONE-acetaminophen (PERCOCET) 5-325 MG per tablet 1 tablet  1 tablet Oral Q4H PRN Justice Fischer MD       • sodium chloride 0.9 % flush 1-10 mL  1-10 mL Intravenous PRN Robb Goldstein MD       • [MAR Hold] sodium chloride 0.9 % flush 10 mL  10 mL Intravenous PRN Kota Drake MD       • sodium chloride 0.9 % infusion  125 mL/hr Intravenous Continuous Kota Drake  mL/hr at 05/22/18 0725 125 mL/hr at 05/22/18 0725       Allergies:  Allergies   Allergen Reactions   • Bupropion Hives       Social History:   Social History     Social History   • Marital status:      Social History Main Topics   • Smoking status: Current Every Day Smoker     Packs/day: 1.00   • Alcohol use Yes      Comment: socially   • Drug use: No   • Sexual activity: Defer     Other Topics Concern   • Not on file        Family History:  History reviewed. No pertinent family history.     Review of Systems: as per HPI, in addition:    General:      + weakness / fatigue,                       + fevers / chills                       no weight loss, decreased  "appetite  HEENT:       no dysphagia / odynophagia  Neck:           normal range of motion, no swelling  Respiratory: no cough / congestion                      No shortness of air                       No wheezing  CV:              No chest pain                       No palpitations  Abdomen/GI: no nausea / vomiting                      No diarrhea / constipation                      No abdominal pain  :             no dysuria / urinary frequency                       No urgency, normal output  Endocrine:   no polyuria / polydipsia,                      No heat or cold intolerance  Skin:           no rashes or skin breakdown   Vascular:   No edema                     No claudication  Psych:        no depression/ anxiety  Neuro:        no focal weakness, no seizures  Musculoskeletal: no joint pain or deformities      Physical Exam:  Vitals:   Temp (24hrs), Av.8 °F (37.1 °C), Min:97.4 °F (36.3 °C), Max:100.7 °F (38.2 °C)    BP (!) 142/110 (BP Location: Right arm, Patient Position: Lying)   Pulse 76   Temp 98.4 °F (36.9 °C) (Oral)   Resp 20   Ht 193 cm (76\")   Wt 118 kg (260 lb)   SpO2 96%   BMI 31.65 kg/m²    Intake/Output:     Intake/Output Summary (Last 24 hours) at 18 1244  Last data filed at 18 0725   Gross per 24 hour   Intake             2050 ml   Output                0 ml   Net             2050 ml        Wt Readings from Last 1 Encounters:   18 1936 118 kg (260 lb)       Exam:    General Appearance:  Awake, alert, oriented x3, no acute distress  Well-appearing, Mild pain    Head and Face:  Normocephalic, atraumatic, mucus membranes moist, oropharynx clear   Eyes:  No icterus, pupils equal round and reactive to light, extraocular movements intact    ENMT: Moist mucosa, tongue symmetric    Neck: Supple  no jugular venous distention  no thyromegaly   Pulmonary:  Respiratory effort: Normal  Auscultation of lungs: Clear bilaterally  No wheezes  No rhonchi  Good air movement, good " expansion   Chest wall:  No tenderness or deformity   Cardiovascular:  Auscultation of the heart: Normal rhythm, no murmurs  No edema of extremities    Abdomen:  Abdomen: soft, non-tender, normal bowel sounds all four quadrants, no masses   Liver and spleen: no hepatosplenomegaly   Musculoskeletal: Digits and nails: normal  Normal range of motion  No joint swelling or gross deformities    Skin: Skin inspection: color normal, no visible rashes or lesions  Skin palpation: texture, turgor normal, no palpable lesions   Lymphatic:  no cervical lymphadenopathy    Psychiatric: Judgement and insight: normal  Orientation to person place and time: normal  Mood and affect: normal       DATA:  Radiology and Labs:  The following labs independently reviewed by me  Old records independently reviewed showing Normal prior renal function, no prior chronic kidney disease or renal failure.  Baseline creatinine 0.8  The following radiologic studies independently viewed by me, findings CT abdomen pelvis showing an obstructive left renal stone with mild hydronephrosis  Interval notes, chart personally reviewed by me.   New problems include acute renal failure, left hydroureter, mild  Discussed with patient and his significant other  Platelet count 210    Risk/ complexity of medical care/ medical decision making  High given need for surgical intervention for his hydronephrosis, need for inpatient mission with IV fluids and close monitoring of renal function            ASSESSMENT:   Problem List:   Acute renal failure, multifactorial from NSAIDs, dehydration, obstruction  Nephrolithiasis, plans for ureteral stent today  Pharyngitis with fevers  Left hydronephrosis  Leukocytosis, improved  Tobacco abuse  ?  Hypertension, monitor for now  Possible urinary tract infection, await culture      PLAN:   Agree with IV fluids, discontinuation of NSAIDs  Noted plans for ureteral stent placement today  Eventual lithotripsy  She was counseled on  "avoiding NSAIDs after discharge  Continue IV antibiotics, await culture results      Continue to monitor electrolytes and volume closely, avoid IV contrast and nephrotoxic medications   Medications on hold: NSAIDs      I appreciate the opportunity to participate in this patient's care.  Please call with any questions or concerns.   Patient follow-up: To be determined      Sidney Payne M.D  [facility]  Office phone number: [phone]  Answering service phone number: [phone]        5/22/2018        Dictation via Dragon dictation software    Electronically signed by Sidney Payne MD at 5/22/2018 12:51 PM     Baron Musa MD at 5/22/2018  6:40 AM         Dictation on: 05/22/2018  6:42 AM by: BARON MUSA [[id number]]         Electronically signed by Baron Musa MD at 5/22/2018  6:42 AM         Patient Vitals for the past 24 hrs:   BP Temp Temp src Pulse Resp SpO2 Height Weight   05/22/18 1109 (!) 142/110 98.4 °F (36.9 °C) Oral 76 20 96 % - -   05/22/18 0707 137/79 98.8 °F (37.1 °C) Oral 67 16 96 % - -   05/22/18 0105 128/84 97.4 °F (36.3 °C) Oral 66 16 96 % - -   05/22/18 0028 137/74 - - 70 - - - -   05/21/18 2358 151/87 - - 74 - - - -   05/21/18 2323 - 98.6 °F (37 °C) Tympanic 83 16 - - -   05/21/18 2257 152/90 - - 89 - - - -   05/21/18 2159 (!) 192/83 - - 95 - - - -   05/21/18 2155 170/95 - - 100 - - - -   05/21/18 2007 153/86 - - - 20 - - -   05/21/18 1936 - (!) 100.7 °F (38.2 °C) Tympanic 114 - 97 % 193 cm (76\") 118 kg (260 lb)       All medication doses during the admission are shown, including meds that are no longer on order.   Scheduled Meds Sorted by Name   for Walker Aldana as of 5/20/18 through 5/22/18     1 Day 3 Days 7 Days 10 Days < Today >    Legend:                          Inactive     Active     Other Encounter    Linked               Medications 05/20/18 05/21/18 05/22/18   acetaminophen (TYLENOL) tablet 1,000 mg  Dose: 1,000 mg  Freq: Once Route: " PO  Start: 05/21/18 2204 End: 05/21/18 2200    Admin Instructions:   Do not exceed 4 grams of acetaminophen in a 24 hr period.    If given for pain, use the following pain scale:   Mild Pain = Pain Score of 1-3, CPOT 1-2  Moderate Pain = Pain Score of 4-6, CPOT 3-4  Severe Pain = Pain Score of 7-10, CPOT 5-8     2200 [C]             cefTRIAXone (ROCEPHIN) IVPB 2 g  Dose: 2 g  Freq: Once Route: IV  Indications of Use: URINARY TRACT INFECTION  Last Dose: Stopped (05/21/18 2245)  Start: 05/21/18 2150 End: 05/21/18 2245    Admin Instructions:   Caution: Look alike/sound alike drug alert. Refrigerate     2218   2240           famotidine (PEPCID) injection 20 mg  Dose: 20 mg  Freq: Once Route: IV  Start: 05/22/18 1200 End: 05/22/18 1130      1130            HYDROmorphone (DILAUDID) injection 0.5 mg  Dose: 0.5 mg  Freq: Once Route: IV  Start: 05/21/18 2247 End: 05/21/18 2254    Admin Instructions:   If given for pain, use the following pain scale:  Mild Pain = Pain Score of 1-3, CPOT 1-2  Moderate Pain = Pain Score of 4-6, CPOT 3-4  Severe Pain = Pain Score of 7-10, CPOT 5-8     2254             sodium chloride 0.9 % bolus 1,000 mL  Dose: 1,000 mL  Freq: Once Route: IV  Last Dose: Stopped (05/21/18 2358)  Start: 05/21/18 2247 End: 05/21/18 2358 2252   2358           Medications 05/20/18 05/21/18 05/22/18       Continuous Meds Sorted by Name   for Walker Aldana as of 5/20/18 through 5/22/18    Legend:                          Inactive     Active     Other Encounter    Linked               Medications 05/20/18 05/21/18 05/22/18   lactated ringers infusion  Rate: 9 mL/hr Dose: 9 mL/hr  Freq: Continuous Route: IV  Last Dose: 9 mL/hr (05/22/18 1130)  Start: 05/22/18 1200      1130            sodium chloride 0.9 % infusion  Rate: 125 mL/hr Dose: 125 mL/hr  Freq: Continuous Route: IV  Last Dose: 125 mL/hr (05/22/18 0725)  Start: 05/21/18 2247     2358          0725                PRN Meds Sorted by Name   for Jovan,  Walker LIRIANO as of 5/20/18 through 5/22/18    Legend:                          Inactive     Active     Other Encounter    Linked               Medications 05/20/18 05/21/18 05/22/18   acetaminophen (TYLENOL) tablet 650 mg  Dose: 650 mg  Freq: Every 6 Hours PRN Route: PO  PRN Reason: Mild Pain   Start: 05/22/18 0122    Admin Instructions:   Do not exceed 4 grams of acetaminophen in a 24 hr period.    If given for pain, use the following pain scale:   Mild Pain = Pain Score of 1-3, CPOT 1-2  Moderate Pain = Pain Score of 4-6, CPOT 3-4  Severe Pain = Pain Score of 7-10, CPOT 5-8      1100            dexamethasone (DECADRON) injection  Freq: As Needed Route: IV  Start: 05/22/18 1312      1312            fentaNYL citrate (PF) (SUBLIMAZE) injection  Freq: As Needed  PRN Reason: Severe Pain   Start: 05/22/18 1301      1301            fentaNYL citrate (PF) (SUBLIMAZE) injection 50 mcg  Dose: 50 mcg  Freq: Every 10 Minutes PRN Route: IV  PRN Reason: Severe Pain   Start: 05/22/18 1116    Admin Instructions:   Maximum total dose of fentanyl is 100 mcg.  If given for pain, use the following pain scale:  Mild Pain = Pain Score of 1-3, CPOT 1-2  Moderate Pain = Pain Score of 4-6, CPOT 3-4  Severe Pain = Pain Score of 7-10, CPOT 5-8      1130            iothalamate (CONRAY) injection  Freq: As Needed  Start: 05/22/18 1311      1311            lidocaine (XYLOCAINE) 2% injection  Freq: As Needed  Start: 05/22/18 1301      1301            lidocaine PF 1% (XYLOCAINE) injection 0.5 mL  Dose: 0.5 mL  Freq: Once As Needed Route: IJ  PRN Comment: IV Start  Start: 05/22/18 1116         midazolam (VERSED) injection 1 mg  Dose: 1 mg  Freq: Every 5 Minutes PRN Route: IV  PRN Reason: Anxiety  PRN Comment: Max 4mg midazolam TOTAL  Start: 05/22/18 1116    Admin Instructions:   Maximum total dose of midazolam is 4 mg.                Or  midazolam (VERSED) injection 2 mg  Dose: 2 mg  Freq: Every 5 Minutes PRN Route: IV  PRN Reason: Anxiety  PRN  Comment: Max 4mg midazolam TOTAL  Start: 05/22/18 1116    Admin Instructions:   Maximum total dose of midazolam is 4 mg.      1130            morphine injection 2 mg  Dose: 2 mg  Freq: Every 3 Hours PRN Route: IV  PRN Reason: Severe Pain   Start: 05/22/18 0122    Admin Instructions:   If given for pain, use the following pain scale:  Mild Pain = Pain Score of 1-3, CPOT 1-2  Moderate Pain = Pain Score of 4-6, CPOT 3-4  Severe Pain = Pain Score of 7-10, CPOT 5-8      0327   0924   1100        ondansetron (ZOFRAN) injection  Freq: As Needed  PRN Reasons: Nausea,Vomiting  Start: 05/22/18 1312      1312            ondansetron (ZOFRAN) injection 4 mg  Dose: 4 mg  Freq: Every 6 Hours PRN Route: IV  PRN Reasons: Nausea,Vomiting  Start: 05/22/18 0122      1100            oxyCODONE-acetaminophen (PERCOCET) 5-325 MG per tablet 1 tablet  Dose: 1 tablet  Freq: Every 4 Hours PRN Route: PO  PRN Reason: Moderate Pain   Start: 05/22/18 0942 End: 06/01/18 0941    Admin Instructions:   Do not exceed 4 grams of acetaminophen in a 24 hr period.    If given for pain, use the following pain scale:   Mild Pain = Pain Score of 1-3, CPOT 1-2  Moderate Pain = Pain Score of 4-6, CPOT 3-4  Severe Pain = Pain Score of 7-10, CPOT 5-8      1100            Propofol (DIPRIVAN) injection  Freq: As Needed Route: IV  Start: 05/22/18 1301      1301            sodium chloride 0.9 % flush 1-10 mL  Dose: 1-10 mL  Freq: As Needed Route: IV  PRN Reason: Line Care  Start: 05/22/18 1116         sodium chloride 0.9 % flush 10 mL  Dose: 10 mL  Freq: As Needed Route: IV  PRN Reason: Line Care  Start: 05/21/18 2007 1100            sterile water irrigation solution  Freq: As Needed  Start: 05/22/18 1311      1311            Medications 05/20/18 05/21/18 05/22/18

## 2018-05-22 NOTE — PROGRESS NOTES
Discharge Planning Assessment   [facility]     Patient Name: Walker Aldana  MRN: [id number]  Today's Date: 5/22/2018    Admit Date: 5/21/2018          Discharge Needs Assessment     Row Name 05/22/18 1617       Living Environment    Lives With spouse    Current Living Arrangements home/apartment/condo    Primary Care Provided by self    Provides Primary Care For no one    Family Caregiver if Needed spouse    Family Caregiver Names Wife Dali    Quality of Family Relationships helpful;involved    Able to Return to Prior Arrangements yes       Resource/Environmental Concerns    Resource/Environmental Concerns none       Transition Planning    Patient/Family Anticipates Transition to home with family    Patient/Family Anticipated Services at Transition none    Transportation Anticipated family or friend will provide       Discharge Needs Assessment    Readmission Within the Last 30 Days no previous admission in last 30 days    Concerns to be Addressed denies needs/concerns at this time    Equipment Currently Used at Home none    Anticipated Changes Related to Illness none            Discharge Plan     Row Name 05/22/18 1617       Plan    Plan Return home with wife    Patient/Family in Agreement with Plan yes    Plan Comments Spoke with patient and wife Dali [phone] at bedside.  Patient is IADL, uses no DME, has used HH after hip replacement.  Patient plans to return home at DC and does not anticipate any DC needs.  CCP will folow as needed.  BHumeniuk RN        Destination     No service coordination in this encounter.      Durable Medical Equipment     No service coordination in this encounter.      Dialysis/Infusion     No service coordination in this encounter.      Home Medical Care     No service coordination in this encounter.      Social Care     No service coordination in this encounter.                Demographic Summary     Row Name 05/22/18 1616       General Information    Admission Type  inpatient    Arrived From home    Referral Source admission list    Reason for Consult discharge planning    Preferred Language English     Used During This Interaction no            Functional Status     Row Name 05/22/18 1616       Functional Status    Usual Activity Tolerance good    Current Activity Tolerance good       Functional Status, IADL    Medications independent    Meal Preparation assistive person   Wife    Housekeeping assistive person    Laundry assistive person    Shopping independent       Mental Status    General Appearance WDL WDL       Mental Status Summary    Recent Changes in Mental Status/Cognitive Functioning no changes            Psychosocial    No documentation.           Abuse/Neglect    No documentation.           Legal    No documentation.           Substance Abuse    No documentation.           Patient Forms    No documentation.         Becky S. Humeniuk, RN

## 2018-05-22 NOTE — CONSULTS
PRINCIPAL DIAGNOSIS: Left ureteral stone and fever.    HISTORY OF PRESENT ILLNESS: This very pleasant white male originally was seen by me in the emergency room. He has an 8 mm stone and was scheduled for lithotripsy. When I talked to the patient on Friday on the phone, he was having persistent pain. I discussed placing an indwelling stent. Patient was afebrile at that time. Patient declined an indwelling stent. He states over the weekend he developed fever as high as 101 and more recently  As high as 101.7. He has had some urinary hesitancy but denies any urgency, frequency, or dysuria. He denies any precipitating or palliating factors associated with his fever. He states his pain has been controlled with oral narcotics. He is admitted now with an exudative pharyngitis and fever. He was noted to have hematuria and pyuria on admission. Patient also notes that he has been having difficulties with a sore throat.    PAST SURGICAL HISTORY:   1.  Multiple orthopedic procedures.  2.  Tonsillectomy and adenoidectomy.  3.  Appendectomy.    SOCIAL HISTORY: The patient is , smokes, and drinks alcohol.    REVIEW OF SYSTEMS: Positive for nausea, flank pain, hesitancy, sore throat, fever.    PHYSICAL EXAMINATION:  GENERAL: He is a well-nourished, well-developed white male, in no apparent distress, alert and oriented x3.  COR: Regular rate and rhythm. No S3, S4, murmur, or rub.  LUNGS: Clear to auscultation.  ABDOMEN: Flanks benign.    IMPRESSION: Ureteral stone and pyuria with fever.    PLAN: It is difficult to know if this patient has an active urinary tract infection or fever related to his exudative pharyngitis. His urine culture and strep culture are pending. I have recommended semi-urgent stent placement. I have discussed the pros, cons, risks, complications. He understands and wishes to proceed. He understands the possible morbidity associated with stent placement.

## 2018-05-22 NOTE — BRIEF OP NOTE
CYSTOSCOPY URETERAL CATHETER/STENT INSERTION  Progress Note    Walker Aldana  5/21/2018 - 5/22/2018    Pre-op Diagnosis:   left ustone and pyelo       Post-Op Diagnosis Codes:   *same    Procedure/CPT® Codes:      Procedure(s):  CYSTOSCOPY URETERAL CATHETER/STENT INSERTION    Surgeon(s):  Baron Graham MD    Anesthesia: General    Staff:   Circulator: Cathy Ronquillo RN  Radiology Technologist: Missael Bain RRT  Scrub Person: Zahra Cam    Estimated Blood Loss: none    Urine Voided: * No values recorded between 5/22/2018 12:57 PM and 5/22/2018  1:36 PM *    Specimens:             none      Drains:      Findings: **left ustoen    Complications: **none      Baron Graham MD     Date: 5/22/2018  Time: 2:32 PM

## 2018-05-22 NOTE — CONSULTS
[facility]                                                                                             Nephrology Initial Consult Note    Patient Identification:  Name: Walker Aldana MRN: [id number]  Age: 52 y.o. : 1965  Sex: male  Date:2018    Requesting Physician: As per consult order.  Reason for Consultation: Acute renal failure  Information from:patient/ family/ chart      History of Present Illness: This is a 52 y.o. year old male  with no significant medical history other than one prior kidney stone, he has no hypertension, diabetes or any other chronic medical issues for which she takes medication.  He has been taking regular doses of NSAIDs, ibuprofen and Mobic for chronic knee osteoarthritis.  He was seen in the emergency department with increasing pain, 8 mm stone was noted in the left ureter with some mild hydronephrosis, fevers to 101.7 at home, possible urinary tract infection and evidence of acute kidney injury.  He was admitted and plans for stent placement at this time in the OR.  Other complaints include sore throat associated with his fevers, some nausea and decreased appetite.    Symptoms are improved by pain medication, worsened by exertion.  Quality of symptoms top flank pain, with growing radiation, severity of symptoms moderate to severe, timing described as gradually worsening over last several days since   Patient denies any nausea or  vomiting, no diarrhea or constipation, + fevers and chills, no shortness of breath, no chest pain.     The following medical history and medications personally reviewed by me:    Problem List:   Patient Active Problem List    Diagnosis   • Exudative pharyngitis [J02.9]   • WOJCIECH (acute kidney injury) [N17.9]   • Hydronephrosis [N13.30]   • Leukocytosis [D72.829]   • Tobacco abuse [Z72.0]   • Nephrolithiasis [N20.0]       Past Medical  History:  Past Medical History:   Diagnosis Date   • Arthritis    • Kidney stones        Past Surgical History:  Past Surgical History:   Procedure Laterality Date   • ADENOIDECTOMY     • APPENDECTOMY     • JOINT REPLACEMENT     • KNEE ARTHROSCOPY Bilateral    • KNEE SURGERY Bilateral    • TONSILLECTOMY     • TOTAL HIP ARTHROPLASTY Left         Home Meds:   No prescriptions prior to admission.       Current Meds:   Current Facility-Administered Medications   Medication Dose Route Frequency Provider Last Rate Last Dose   • [MAR Hold] acetaminophen (TYLENOL) tablet 650 mg  650 mg Oral Q6H PRN Baron Lay MD       • fentaNYL citrate (PF) (SUBLIMAZE) injection 50 mcg  50 mcg Intravenous Q10 Min PRN Robb Goldstein MD   50 mcg at 05/22/18 1130   • lactated ringers infusion  9 mL/hr Intravenous Continuous Robb Goldstein MD 9 mL/hr at 05/22/18 1130 9 mL/hr at 05/22/18 1130   • lidocaine PF 1% (XYLOCAINE) injection 0.5 mL  0.5 mL Injection Once PRN Robb Goldstein MD       • midazolam (VERSED) injection 1 mg  1 mg Intravenous Q5 Min PRN Robb Goldstein MD        Or   • midazolam (VERSED) injection 2 mg  2 mg Intravenous Q5 Min PRN Robb Goldstein MD   2 mg at 05/22/18 1130   • [MAR Hold] morphine injection 2 mg  2 mg Intravenous Q3H PRN Baron Lay MD   2 mg at 05/22/18 0924   • [MAR Hold] ondansetron (ZOFRAN) injection 4 mg  4 mg Intravenous Q6H PRN Baron Lay MD       • [MAR Hold] oxyCODONE-acetaminophen (PERCOCET) 5-325 MG per tablet 1 tablet  1 tablet Oral Q4H PRN Justicemaria antonia Fischer MD       • sodium chloride 0.9 % flush 1-10 mL  1-10 mL Intravenous PRN Robb Goldstein MD       • [MAR Hold] sodium chloride 0.9 % flush 10 mL  10 mL Intravenous PRN Kota Drake MD       • sodium chloride 0.9 % infusion  125 mL/hr Intravenous Continuous Kota Drake  mL/hr at 05/22/18 0725 125 mL/hr at 05/22/18 0725       Allergies:  Allergies  "  Allergen Reactions   • Bupropion Hives       Social History:   Social History     Social History   • Marital status:      Social History Main Topics   • Smoking status: Current Every Day Smoker     Packs/day: 1.00   • Alcohol use Yes      Comment: socially   • Drug use: No   • Sexual activity: Defer     Other Topics Concern   • Not on file        Family History:  History reviewed. No pertinent family history.     Review of Systems: as per HPI, in addition:    General:      + weakness / fatigue,                       + fevers / chills                       no weight loss, decreased appetite  HEENT:       no dysphagia / odynophagia  Neck:           normal range of motion, no swelling  Respiratory: no cough / congestion                      No shortness of air                       No wheezing  CV:              No chest pain                       No palpitations  Abdomen/GI: no nausea / vomiting                      No diarrhea / constipation                      No abdominal pain  :             no dysuria / urinary frequency                       No urgency, normal output  Endocrine:   no polyuria / polydipsia,                      No heat or cold intolerance  Skin:           no rashes or skin breakdown   Vascular:   No edema                     No claudication  Psych:        no depression/ anxiety  Neuro:        no focal weakness, no seizures  Musculoskeletal: no joint pain or deformities      Physical Exam:  Vitals:   Temp (24hrs), Av.8 °F (37.1 °C), Min:97.4 °F (36.3 °C), Max:100.7 °F (38.2 °C)    BP (!) 142/110 (BP Location: Right arm, Patient Position: Lying)   Pulse 76   Temp 98.4 °F (36.9 °C) (Oral)   Resp 20   Ht 193 cm (76\")   Wt 118 kg (260 lb)   SpO2 96%   BMI 31.65 kg/m²   Intake/Output:     Intake/Output Summary (Last 24 hours) at 18 1244  Last data filed at 18 0773   Gross per 24 hour   Intake             2050 ml   Output                0 ml   Net             2050 ml "        Wt Readings from Last 1 Encounters:   05/21/18 1936 118 kg (260 lb)       Exam:    General Appearance:  Awake, alert, oriented x3, no acute distress  Well-appearing, Mild pain    Head and Face:  Normocephalic, atraumatic, mucus membranes moist, oropharynx clear   Eyes:  No icterus, pupils equal round and reactive to light, extraocular movements intact    ENMT: Moist mucosa, tongue symmetric    Neck: Supple  no jugular venous distention  no thyromegaly   Pulmonary:  Respiratory effort: Normal  Auscultation of lungs: Clear bilaterally  No wheezes  No rhonchi  Good air movement, good expansion   Chest wall:  No tenderness or deformity   Cardiovascular:  Auscultation of the heart: Normal rhythm, no murmurs  No edema of extremities    Abdomen:  Abdomen: soft, non-tender, normal bowel sounds all four quadrants, no masses   Liver and spleen: no hepatosplenomegaly   Musculoskeletal: Digits and nails: normal  Normal range of motion  No joint swelling or gross deformities    Skin: Skin inspection: color normal, no visible rashes or lesions  Skin palpation: texture, turgor normal, no palpable lesions   Lymphatic:  no cervical lymphadenopathy    Psychiatric: Judgement and insight: normal  Orientation to person place and time: normal  Mood and affect: normal       DATA:  Radiology and Labs:  The following labs independently reviewed by me  Old records independently reviewed showing Normal prior renal function, no prior chronic kidney disease or renal failure.  Baseline creatinine 0.8  The following radiologic studies independently viewed by me, findings CT abdomen pelvis showing an obstructive left renal stone with mild hydronephrosis  Interval notes, chart personally reviewed by me.   New problems include acute renal failure, left hydroureter, mild  Discussed with patient and his significant other  Platelet count 210    Risk/ complexity of medical care/ medical decision making  High given need for surgical intervention  for his hydronephrosis, need for inpatient mission with IV fluids and close monitoring of renal function          Labs:   Recent Results (from the past 24 hour(s))   Comprehensive Metabolic Panel    Collection Time: 05/21/18  9:37 PM   Result Value Ref Range    Glucose 111 (H) 65 - 99 mg/dL    BUN 14 6 - 20 mg/dL    Creatinine 1.47 (H) 0.76 - 1.27 mg/dL    Sodium 136 136 - 145 mmol/L    Potassium 4.2 3.5 - 5.2 mmol/L    Chloride 97 (L) 98 - 107 mmol/L    CO2 25.2 22.0 - 29.0 mmol/L    Calcium 9.2 8.6 - 10.5 mg/dL    Total Protein 7.4 6.0 - 8.5 g/dL    Albumin 3.90 3.50 - 5.20 g/dL    ALT (SGPT) 11 1 - 41 U/L    AST (SGOT) 9 1 - 40 U/L    Alkaline Phosphatase 57 39 - 117 U/L    Total Bilirubin 0.5 0.1 - 1.2 mg/dL    eGFR  African Amer 61 >60 mL/min/1.73    Globulin 3.5 gm/dL    A/G Ratio 1.1 g/dL    BUN/Creatinine Ratio 9.5 7.0 - 25.0    Anion Gap 13.8 mmol/L   Lipase    Collection Time: 05/21/18  9:37 PM   Result Value Ref Range    Lipase 10 (L) 13 - 60 U/L   Light Blue Top    Collection Time: 05/21/18  9:37 PM   Result Value Ref Range    Extra Tube hold for add-on    Green Top (Gel)    Collection Time: 05/21/18  9:37 PM   Result Value Ref Range    Extra Tube Hold for add-ons.    Lavender Top    Collection Time: 05/21/18  9:37 PM   Result Value Ref Range    Extra Tube hold for add-on    Gold Top - SST    Collection Time: 05/21/18  9:37 PM   Result Value Ref Range    Extra Tube Hold for add-ons.    CBC Auto Differential    Collection Time: 05/21/18  9:37 PM   Result Value Ref Range    WBC 14.03 (H) 4.50 - 10.70 10*3/mm3    RBC 5.01 4.60 - 6.00 10*6/mm3    Hemoglobin 13.8 13.7 - 17.6 g/dL    Hematocrit 42.2 40.4 - 52.2 %    MCV 84.2 79.8 - 96.2 fL    MCH 27.5 27.0 - 32.7 pg    MCHC 32.7 32.6 - 36.4 g/dL    RDW 12.9 11.5 - 14.5 %    RDW-SD 39.3 37.0 - 54.0 fl    MPV 9.6 6.0 - 12.0 fL    Platelets 217 140 - 500 10*3/mm3    Neutrophil % 81.0 (H) 42.7 - 76.0 %    Lymphocyte % 12.0 (L) 19.6 - 45.3 %    Monocyte % 6.1 5.0 -  12.0 %    Eosinophil % 0.8 0.3 - 6.2 %    Basophil % 0.1 0.0 - 1.5 %    Immature Grans % 0.2 0.0 - 0.5 %    Neutrophils, Absolute 11.36 (H) 1.90 - 8.10 10*3/mm3    Lymphocytes, Absolute 1.69 0.90 - 4.80 10*3/mm3    Monocytes, Absolute 0.86 0.20 - 1.20 10*3/mm3    Eosinophils, Absolute 0.11 0.00 - 0.70 10*3/mm3    Basophils, Absolute 0.01 0.00 - 0.20 10*3/mm3    Immature Grans, Absolute 0.03 0.00 - 0.03 10*3/mm3   Urinalysis With / Culture If Indicated - Urine, Clean Catch    Collection Time: 05/21/18  9:41 PM   Result Value Ref Range    Color, UA Yellow Yellow, Straw    Appearance, UA Clear Clear    pH, UA 5.5 5.0 - 8.0    Specific Gravity, UA 1.021 1.005 - 1.030    Glucose, UA Negative Negative    Ketones, UA Negative Negative    Bilirubin, UA Negative Negative    Blood, UA Small (1+) (A) Negative    Protein, UA Negative Negative    Leuk Esterase, UA Small (1+) (A) Negative    Nitrite, UA Negative Negative    Urobilinogen, UA 1.0 E.U./dL 0.2 - 1.0 E.U./dL   Urinalysis, Microscopic Only - Urine, Clean Catch    Collection Time: 05/21/18  9:41 PM   Result Value Ref Range    RBC, UA 13-20 (A) None Seen, 0-2 /HPF    WBC, UA 6-12 (A) None Seen, 0-2 /HPF    Bacteria, UA None Seen None Seen /HPF    Squamous Epithelial Cells, UA 0-2 None Seen, 0-2 /HPF    Hyaline Casts, UA 0-2 None Seen /LPF    Methodology Automated Microscopy    Blood Culture - Blood,    Collection Time: 05/21/18 10:06 PM   Result Value Ref Range    Blood Culture No growth at less than 24 hours    Blood Culture - Blood,    Collection Time: 05/21/18 10:06 PM   Result Value Ref Range    Blood Culture No growth at less than 24 hours    Lactic Acid, Plasma    Collection Time: 05/21/18 10:06 PM   Result Value Ref Range    Lactate 0.6 0.5 - 2.0 mmol/L   Rapid Strep A Screen - Swab, Throat    Collection Time: 05/21/18 10:06 PM   Result Value Ref Range    Strep A Ag Negative Negative   Beta Strep Culture, Throat - Swab, Throat    Collection Time: 05/21/18 10:06 PM    Result Value Ref Range    Throat Culture, Beta Strep No Beta Hemolytic Streptococcus Isolated    Basic Metabolic Panel    Collection Time: 05/22/18  5:16 AM   Result Value Ref Range    Glucose 91 65 - 99 mg/dL    BUN 14 6 - 20 mg/dL    Creatinine 1.50 (H) 0.76 - 1.27 mg/dL    Sodium 138 136 - 145 mmol/L    Potassium 4.4 3.5 - 5.2 mmol/L    Chloride 99 98 - 107 mmol/L    CO2 26.2 22.0 - 29.0 mmol/L    Calcium 8.8 8.6 - 10.5 mg/dL    eGFR  African Amer 60 (L) >60 mL/min/1.73    BUN/Creatinine Ratio 9.3 7.0 - 25.0    Anion Gap 12.8 mmol/L   CBC Auto Differential    Collection Time: 05/22/18  5:16 AM   Result Value Ref Range    WBC 11.09 (H) 4.50 - 10.70 10*3/mm3    RBC 4.57 (L) 4.60 - 6.00 10*6/mm3    Hemoglobin 12.4 (L) 13.7 - 17.6 g/dL    Hematocrit 39.4 (L) 40.4 - 52.2 %    MCV 86.2 79.8 - 96.2 fL    MCH 27.1 27.0 - 32.7 pg    MCHC 31.5 (L) 32.6 - 36.4 g/dL    RDW 12.9 11.5 - 14.5 %    RDW-SD 40.8 37.0 - 54.0 fl    MPV 9.9 6.0 - 12.0 fL    Platelets 210 140 - 500 10*3/mm3    Neutrophil % 71.4 42.7 - 76.0 %    Lymphocyte % 19.1 (L) 19.6 - 45.3 %    Monocyte % 8.0 5.0 - 12.0 %    Eosinophil % 1.2 0.3 - 6.2 %    Basophil % 0.1 0.0 - 1.5 %    Immature Grans % 0.2 0.0 - 0.5 %    Neutrophils, Absolute 7.92 1.90 - 8.10 10*3/mm3    Lymphocytes, Absolute 2.12 0.90 - 4.80 10*3/mm3    Monocytes, Absolute 0.89 0.20 - 1.20 10*3/mm3    Eosinophils, Absolute 0.13 0.00 - 0.70 10*3/mm3    Basophils, Absolute 0.01 0.00 - 0.20 10*3/mm3    Immature Grans, Absolute 0.02 0.00 - 0.03 10*3/mm3   Respiratory Panel, PCR - Swab, Nasopharynx    Collection Time: 05/22/18 10:01 AM   Result Value Ref Range    ADENOVIRUS, PCR Not Detected Not Detected    Coronavirus 229E Not Detected Not Detected    Coronavirus HKU1 Not Detected Not Detected    Coronavirus NL63 Not Detected Not Detected    Coronavirus OC43 Not Detected Not Detected    Human Metapneumovirus Not Detected Not Detected    Human Rhinovirus/Enterovirus Not Detected Not Detected     Influenza B PCR Not Detected Not Detected    Parainfluenza Virus 1 Not Detected Not Detected    Parainfluenza Virus 2 Not Detected Not Detected    Parainfluenza Virus 3 Not Detected Not Detected    Parainfluenza Virus 4 Not Detected Not Detected    Bordetella pertussis pcr Not Detected Not Detected    Influenza A H1 2009 PCR Not Detected Not Detected    Chlamydophila pneumoniae PCR Not Detected Not Detected    Mycoplasma pneumo by PCR Not Detected Not Detected    Influenza A PCR Not Detected Not Detected    Influenza A H3 Not Detected Not Detected    Influenza A H1 Not Detected Not Detected    RSV, PCR Not Detected Not Detected       Radiology:  Imaging Results (last 24 hours)     ** No results found for the last 24 hours. **               ASSESSMENT:   Problem List:   Acute renal failure, multifactorial from NSAIDs, dehydration, obstruction  Nephrolithiasis, plans for ureteral stent today  Pharyngitis with fevers  Left hydronephrosis  Leukocytosis, improved  Tobacco abuse  ?  Hypertension, monitor for now  Possible urinary tract infection, await culture      PLAN:   Agree with IV fluids, discontinuation of NSAIDs  Noted plans for ureteral stent placement today  Eventual lithotripsy  She was counseled on avoiding NSAIDs after discharge  Continue IV antibiotics, await culture results      Continue to monitor electrolytes and volume closely, avoid IV contrast and nephrotoxic medications   Medications on hold: NSAIDs      I appreciate the opportunity to participate in this patient's care.  Please call with any questions or concerns.   Patient follow-up: To be determined      Sidney Payne M.D  [facility]  Office phone number: [phone]  Answering service phone number: [phone]        5/22/2018        Dictation via Dragon dictation software

## 2018-05-22 NOTE — ANESTHESIA PROCEDURE NOTES
Airway  Urgency: elective    Airway not difficult    General Information and Staff    Patient location during procedure: OR  CRNA: MARIO COUCH    Indications and Patient Condition  Indications for airway management: airway protection    Preoxygenated: yes  Mask difficulty assessment: 1 - vent by mask    Final Airway Details  Final airway type: supraglottic airway      Successful airway: classic  Size 5    Number of attempts at approach: 1    Additional Comments  LMA placed easily.  Cuff MOP.

## 2018-05-22 NOTE — H&P
[facility]    Patient Identification:  Name: Walker Aldana  Age: 52 y.o.  Sex: male  :  1965  MRN: [id number]         Primary Care Physician: JUVENTINO Giraldo MD    Chief Complaint   Patient presents with   • Flank Pain   • Fatigue   • Fever     Subjective   Patient is a 52 y.o. male with a history of kidney stones in his 20s was in the emergency department on 18 diagnosed with a kidney stone. He presented with left flank pain. He had mild hydronephrosis on CT scan with perinephric stranding. Urology saw the patient and recommended outpatient lithotripsy. The pain he describes it as left flank sharp stabbing. Pain medication may have helped the pain but it made it so that he was not able to sleep very well. He states he cannot get a deep sleep. He has had fevers and chills. A couple of days ago he had sore throat. His daughter is sick with a similar illness with a sore throat. He has a mild cough. No rhinorrhea. No chest pain. No shortness of breath. He did feel a little bit anxious trying to go to bed last couple of nights. The left-sided flank pain seemed to be worsening with sore throat and he had fever and presented to the emergency department. He has had nausea and decreased appetite.    Past Medical History:   Diagnosis Date   • Arthritis    • Kidney stones      Past Surgical History:   Procedure Laterality Date   • ADENOIDECTOMY     • APPENDECTOMY     • JOINT REPLACEMENT     • KNEE ARTHROSCOPY Bilateral    • KNEE SURGERY Bilateral    • TONSILLECTOMY     • TOTAL HIP ARTHROPLASTY Left      Current medications reviewed.    History reviewed. No pertinent family history.  Social History   Substance Use Topics   • Smoking status: Current Every Day Smoker     Packs/day: 1.00   • Smokeless tobacco: Not on file   • Alcohol use Yes      Comment: socially     Review of Systems   Constitutional: Positive for appetite change and fever. Negative for  chills.   HENT: Positive for sore throat. Negative for congestion, drooling and rhinorrhea.    Eyes: Negative.    Respiratory: Negative.  Negative for chest tightness and shortness of breath.    Cardiovascular: Negative.  Negative for chest pain.   Gastrointestinal: Positive for nausea. Negative for abdominal pain, diarrhea and vomiting.   Endocrine: Negative.    Genitourinary: Positive for flank pain.   Skin: Negative.    Allergic/Immunologic: Negative.    Neurological: Negative.    Hematological: Negative.    Psychiatric/Behavioral: Negative.       Objective      Vital Signs  Temp:  [97.4 °F (36.3 °C)-100.7 °F (38.2 °C)] 98.8 °F (37.1 °C)  Heart Rate:  [] 67  Resp:  [16-20] 16  BP: (128-192)/(74-95) 137/79  Body mass index is 31.65 kg/m².    Physical Exam   Constitutional: He is oriented to person, place, and time. He appears well-developed and well-nourished.  Non-toxic appearance. He does not have a sickly appearance. He does not appear ill. No distress.   HENT:   Head: Normocephalic and atraumatic.   Mouth/Throat: Oropharyngeal exudate (white) and posterior oropharyngeal erythema present.   Eyes: Conjunctivae and EOM are normal.   Neck: Phonation normal. Neck supple. No tracheal deviation and normal range of motion present.   Cardiovascular: Normal rate, regular rhythm and intact distal pulses.    Pulses:       Radial pulses are 2+ on the right side, and 2+ on the left side.        Dorsalis pedis pulses are 2+ on the right side, and 2+ on the left side.   Pulmonary/Chest: Effort normal and breath sounds normal. No stridor. No respiratory distress. He has no wheezes. He has no rales.   Abdominal: Soft. He exhibits no distension. There is no tenderness. There is no rebound and no guarding.   Musculoskeletal: He exhibits no edema.   Lymphadenopathy:     He has cervical adenopathy.   Neurological: He is alert and oriented to person, place, and time.   Skin: Skin is warm and dry.   Psychiatric: He has a normal  mood and affect. His behavior is normal.     Lab Results   Component Value Date    WBC 11.09 (H) 05/22/2018    HGB 12.4 (L) 05/22/2018    HCT 39.4 (L) 05/22/2018     05/22/2018     Lab Results   Component Value Date     05/22/2018    K 4.4 05/22/2018    CL 99 05/22/2018    CO2 26.2 05/22/2018    BUN 14 05/22/2018    CREATININE 1.50 (H) 05/22/2018    GLUCOSE 91 05/22/2018     Lab Results   Component Value Date    CALCIUM 8.8 05/22/2018     Lab Results   Component Value Date    AST 9 05/21/2018    ALT 11 05/21/2018    ALKPHOS 57 05/21/2018     Lab Results   Component Value Date    COLORU Yellow 05/21/2018    CLARITYU Clear 05/21/2018    PHUR 5.5 05/21/2018    GLUCOSEU Negative 05/21/2018    KETONESU Negative 05/21/2018    BLOODU Small (1+) (A) 05/21/2018    LEUKOCYTESUR Small (1+) (A) 05/21/2018    BILIRUBINUR Negative 05/21/2018    UROBILINOGEN 1.0 E.U./dL 05/21/2018    RBCUA 13-20 (A) 05/21/2018    WBCUA 6-12 (A) 05/21/2018    BACTERIA None Seen 05/21/2018     X-ray and CT noted    Lab Results   Component Value Date    ANIONGAP 12.8 05/22/2018     Estimated Creatinine Clearance: 82.8 mL/min (A) (by C-G formula based on SCr of 1.5 mg/dL (H)).    Assessment/Plan   Active Hospital Problems (** Indicates Principal Problem)    Diagnosis Date Noted   • **Nephrolithiasis [N20.0] 05/21/2018   • Exudative pharyngitis [J02.9] 05/22/2018   • WOJCIECH (acute kidney injury) [N17.9] 05/22/2018   • Hydronephrosis [N13.30] 05/22/2018   • Leukocytosis [D72.829] 05/22/2018   • Tobacco abuse [Z72.0] 05/22/2018      Resolved Hospital Problems    Diagnosis Date Noted Date Resolved   No resolved problems to display.     · 52 y.o. male with a ureteral stone with pyuria and fever and exudative pharyngitis. Strep swab negative. Lactate normal. Leukocytosis has improved from yesterday.  · Antibiotics and supportive care  · SCDs  · analgesia. switch to percocet since lortab possible side effect. IV pain medication when  necessary  · Urology evaluated the patient plans on semi-emergent stent placement today  · acute kidney injury possible from UTI/hydronephrosis. IVF, nephrology consult.  · reviewed records from 5/17/18  · Discussed with patient smoking cessation. He plans to quit.  · I discussed the patient's findings and my recommendations with patient, family and nursing staff.    Justice Fischer MD  05/22/18  9:52 AM

## 2018-05-22 NOTE — OP NOTE
PREOPERATIVE DIAGNOSIS: Left ureteral stone and pyelonephritis.    POSTOPERATIVE DIAGNOSIS: Left ureteral stone and pyelonephritis.    PROCEDURE PERFORMED: Cystoscopy and left stent.    SURGEON: Baron Graham MD    COMPLICATIONS: None.    CONSULTATIONS: None.    FINDINGS: Left proximal ureteral stone and a large amount of pus in the left kidney.    OPERATIVE TECHNIQUE: Patient brought in the operating room and placed on the operating table. General anesthesia was induced. Cystoscopy revealed no abnormalities in the dorsal lithotomy position. A guidewire was passed and a subsequent 6 x 26 double J stent was passed beyond a proximally noted ureteral stone. A large mount of thick pus extruded from the left ureteral orifice. The bladder was drained. The stent position was confirmed fluoroscopically and endoscopically. The patient was awakened and taken to the recovery room in good and stable condition.

## 2018-05-22 NOTE — PLAN OF CARE
Problem: Patient Care Overview  Goal: Plan of Care Review  Outcome: Ongoing (interventions implemented as appropriate)   05/22/18 7141   Coping/Psychosocial   Plan of Care Reviewed With patient   Plan of Care Review   Progress improving   OTHER   Outcome Summary Pt had cysto today and stent placed in the left ureter. Pt has been to the br and urinated fluids still going pt has had clears and is going to have reg diet this evening for dinner will cont to monitor poss D/C in the am       Problem: Infection, Risk/Actual (Adult)  Goal: Identify Related Risk Factors and Signs and Symptoms  Outcome: Ongoing (interventions implemented as appropriate)    Goal: Infection Prevention/Resolution  Outcome: Ongoing (interventions implemented as appropriate)      Problem: Pain, Acute (Adult)  Goal: Identify Related Risk Factors and Signs and Symptoms  Outcome: Ongoing (interventions implemented as appropriate)    Goal: Acceptable Pain Control/Comfort Level  Outcome: Ongoing (interventions implemented as appropriate)

## 2018-05-22 NOTE — ED PROVIDER NOTES
[facility] EMERGENCY DEPARTMENT ENCOUNTER    CHIEF COMPLAINT  Chief Complaint: Fever  History given by: pt/ family is present at bedside  History limited by: N/A  Room Number: 50/50  PMD: JUVENTINO Giraldo MD      HPI:  Pt is a 52 y.o. male who presents complaining of constant fever that began 2 days ago with worsening today. Pt also c/o nausea, worsening L sided flank pain, worsening back pain, and sore throat, but but denies vomiting or any other pertinent symptoms. Pt was scheduled for a lithotripsy procedure tomorrow for a known renal calculus. Pt was evaluated in the ER on 5/17/18, but returns today due to fever [Tmax of 101-102 degrees F]. Pt denies being around anyone who has been recently sick. Pt has had a previous tonsillectomy and adenoidectomy. Pt is currently taking hydrocodone with no reported relief.  The patient's sore throat started 2 days ago and was gradual in onset.    Duration:  2 days   Onset: gradual  Timing: constant   Location: N/A  Radiation: N/A  Quality: fever [Tmax of 101-102 degrees F]  Intensity/Severity: moderate  Progression: worsening   Associated Symptoms: nausea, worsening L flank pain, worsening back pain, sore throat   Aggravating Factors: None reported  Alleviating Factors: None reported   Previous Episodes: Pt was recently diagnosed with a renal calculus and scheduled for surgery tomorrow.  Treatment before arrival: Pt is currently taking hydrocodone with no relief.     PAST MEDICAL HISTORY  Active Ambulatory Problems     Diagnosis Date Noted   • No Active Ambulatory Problems     Resolved Ambulatory Problems     Diagnosis Date Noted   • No Resolved Ambulatory Problems     Past Medical History:   Diagnosis Date   • Arthritis    • Kidney stones        PAST SURGICAL HISTORY  Past Surgical History:   Procedure Laterality Date   • ADENOIDECTOMY     • APPENDECTOMY     • JOINT REPLACEMENT     • KNEE ARTHROSCOPY Bilateral    • KNEE SURGERY Bilateral    • TONSILLECTOMY     • TOTAL HIP  ARTHROPLASTY Left        FAMILY HISTORY  History reviewed. No pertinent family history.    SOCIAL HISTORY  Social History     Social History   • Marital status:      Spouse name: N/A   • Number of children: N/A   • Years of education: N/A     Occupational History   • Not on file.     Social History Main Topics   • Smoking status: Current Every Day Smoker     Packs/day: 1.00   • Smokeless tobacco: Not on file   • Alcohol use Yes      Comment: socially   • Drug use: No   • Sexual activity: Defer     Other Topics Concern   • Not on file     Social History Narrative   • No narrative on file       ALLERGIES  Patient has no known allergies.    REVIEW OF SYSTEMS  Review of Systems   Constitutional: Positive for fever. Negative for chills.   HENT: Positive for sore throat.    Eyes: Negative.    Respiratory: Negative.  Negative for cough.    Cardiovascular: Negative.  Negative for chest pain.   Gastrointestinal: Positive for nausea. Negative for vomiting.   Genitourinary: Positive for flank pain (worsening L sided ). Negative for dysuria.   Musculoskeletal: Positive for back pain (worsening ).   Skin: Negative.  Negative for rash.   Neurological: Negative.  Negative for headaches.       PHYSICAL EXAM  ED Triage Vitals   Temp Heart Rate Resp BP SpO2   05/21/18 1936 05/21/18 1936 05/21/18 2007 05/21/18 2007 05/21/18 1936   (!) 100.7 °F (38.2 °C) 114 20 153/86 97 %      Temp src Heart Rate Source Patient Position BP Location FiO2 (%)   05/21/18 1936 -- -- -- --   Tympanic           Physical Exam   Constitutional: He is oriented to person, place, and time and well-developed, well-nourished, and in no distress.   The patient is not septic or toxic appearing.   HENT:   Head: Normocephalic and atraumatic.   Mouth/Throat: Oropharyngeal exudate (white exudate to uvula ) and posterior oropharyngeal erythema present. No tonsillar abscesses.   No trismus   Voice is normal  Pt is maintaining his airway without any difficulty and  without any stridor or airway compromise.  The patient is handling his secretions without any difficulty.  There is no swelling under his tongue was well as his anterior neck.   Eyes: EOM are normal. Pupils are equal, round, and reactive to light.   Neck: Normal range of motion. Neck supple.   Cardiovascular: Normal rate, regular rhythm, normal heart sounds and intact distal pulses.    Pulmonary/Chest: Effort normal and breath sounds normal. No respiratory distress.   Abdominal: Soft. Bowel sounds are normal. He exhibits no distension. There is no tenderness. There is no rebound and no guarding.   The patient does have some left CVA tenderness subjectively on exam.   Musculoskeletal: Normal range of motion. He exhibits no edema.   Lymphadenopathy:     He has cervical adenopathy (L sided at angle of jaw ).   Neurological: He is alert and oriented to person, place, and time. He has normal sensation and normal strength.   Skin: Skin is warm and dry.   Psychiatric: Mood and affect normal.   Nursing note and vitals reviewed.      LAB RESULTS  Lab Results (last 24 hours)     Procedure Component Value Units Date/Time    CBC & Differential [[id number]] Collected:  05/21/18 2137    Specimen:  Blood Updated:  05/21/18 2152    Narrative:       The following orders were created for panel order CBC & Differential.  Procedure                               Abnormality         Status                     ---------                               -----------         ------                     CBC Auto Differential[[id number]]        Abnormal            Final result                 Please view results for these tests on the individual orders.    Comprehensive Metabolic Panel [[id number]]  (Abnormal) Collected:  05/21/18 2137    Specimen:  Blood Updated:  05/21/18 2212     Glucose 111 (H) mg/dL      BUN 14 mg/dL      Creatinine 1.47 (H) mg/dL      Sodium 136 mmol/L      Potassium 4.2 mmol/L      Chloride 97 (L) mmol/L      CO2 25.2 mmol/L       Calcium 9.2 mg/dL      Total Protein 7.4 g/dL      Albumin 3.90 g/dL      ALT (SGPT) 11 U/L      AST (SGOT) 9 U/L      Alkaline Phosphatase 57 U/L      Total Bilirubin 0.5 mg/dL      eGFR   Amer 61 mL/min/1.73      Globulin 3.5 gm/dL      A/G Ratio 1.1 g/dL      BUN/Creatinine Ratio 9.5     Anion Gap 13.8 mmol/L     Lipase [[id number]]  (Abnormal) Collected:  05/21/18 2137    Specimen:  Blood Updated:  05/21/18 2211     Lipase 10 (L) U/L     CBC Auto Differential [[id number]]  (Abnormal) Collected:  05/21/18 2137    Specimen:  Blood Updated:  05/21/18 2152     WBC 14.03 (H) 10*3/mm3      RBC 5.01 10*6/mm3      Hemoglobin 13.8 g/dL      Hematocrit 42.2 %      MCV 84.2 fL      MCH 27.5 pg      MCHC 32.7 g/dL      RDW 12.9 %      RDW-SD 39.3 fl      MPV 9.6 fL      Platelets 217 10*3/mm3      Neutrophil % 81.0 (H) %      Lymphocyte % 12.0 (L) %      Monocyte % 6.1 %      Eosinophil % 0.8 %      Basophil % 0.1 %      Immature Grans % 0.2 %      Neutrophils, Absolute 11.36 (H) 10*3/mm3      Lymphocytes, Absolute 1.69 10*3/mm3      Monocytes, Absolute 0.86 10*3/mm3      Eosinophils, Absolute 0.11 10*3/mm3      Basophils, Absolute 0.01 10*3/mm3      Immature Grans, Absolute 0.03 10*3/mm3     Urinalysis With / Culture If Indicated - Urine, Clean Catch [[id number]]  (Abnormal) Collected:  05/21/18 2141    Specimen:  Urine from Urine, Clean Catch Updated:  05/21/18 2151     Color, UA Yellow     Appearance, UA Clear     pH, UA 5.5     Specific Gravity, UA 1.021     Glucose, UA Negative     Ketones, UA Negative     Bilirubin, UA Negative     Blood, UA Small (1+) (A)     Protein, UA Negative     Leuk Esterase, UA Small (1+) (A)     Nitrite, UA Negative     Urobilinogen, UA 1.0 E.U./dL    Urinalysis, Microscopic Only - Urine, Clean Catch [[id number]]  (Abnormal) Collected:  05/21/18 2141    Specimen:  Urine from Urine, Clean Catch Updated:  05/21/18 2151     RBC, UA 13-20 (A) /HPF      WBC, UA 6-12 (A) /HPF      Bacteria,  UA None Seen /HPF      Squamous Epithelial Cells, UA 0-2 /HPF      Hyaline Casts, UA 0-2 /LPF      Methodology Automated Microscopy    Urine Culture - Urine, Urine, Clean Catch [[id number]] Collected:  05/21/18 2141    Specimen:  Urine from Urine, Clean Catch Updated:  05/21/18 2151    Blood Culture - Blood, [[id number]] Collected:  05/21/18 2206    Specimen:  Blood from Arm, Left Updated:  05/21/18 2220    Blood Culture - Blood, [[id number]] Collected:  05/21/18 2206    Specimen:  Blood from Arm, Right Updated:  05/21/18 2219    Lactic Acid, Plasma [[id number]]  (Normal) Collected:  05/21/18 2206    Specimen:  Blood Updated:  05/21/18 2238     Lactate 0.6 mmol/L     Rapid Strep A Screen - Swab, Throat [[id number]]  (Normal) Collected:  05/21/18 2206    Specimen:  Swab from Throat Updated:  05/21/18 2232     Strep A Ag Negative    Beta Strep Culture, Throat - Swab, Throat [[id number]] Collected:  05/21/18 2206    Specimen:  Swab from Throat Updated:  05/21/18 2232          I ordered the above labs and reviewed the results    PROCEDURES  Procedures      PROGRESS AND CONSULTS     2007  Ordered IVF and labs for further evaluation.     2148  Ordered Rocephin for infection.     2202  Ordered Tylenol for fever. Placed consult to Urology.     2228  Discussed pt's case with Dr. Camarena [Urology] who agrees to consult, but would like medicine to admit.     2235  Placed consult to [facility].     2238  Rechecked pt who is in no acute distress. Discussed lab results with pt; strep test is negative.   ---Reassessment: Pt is non-toxic/ non-septic appearing.     2242  Discussed pt's case with Dr. Lay [[facility]] who agrees to admit.     2245  Ordered IVF bolus and Dilaudid for pain.     MEDICAL DECISION MAKING  Results were reviewed/discussed with the patient and they were also made aware of online access. Pt also made aware that some labs, such as cultures, will not be resulted during ER visit and follow up with PMD is necessary.      MDM  Number of Diagnoses or Management Options     Amount and/or Complexity of Data Reviewed  Clinical lab tests: reviewed and ordered (WBC = 14.03  Negative strep screen )  Discussion of test results with the performing providers: yes (Dr. Camarena (Urology)  Dr. Lay ([facility]) )  Decide to obtain previous medical records or to obtain history from someone other than the patient: yes  Review and summarize past medical records: yes (Pt was evaluated on 5/17/18 and diagnosed with renal calculus with stranding. )  Independent visualization of images, tracings, or specimens: yes           DIAGNOSIS  Final diagnoses:   Nephrolithiasis   Acute renal failure, unspecified acute renal failure type   Leukocytosis, unspecified type   Exudative pharyngitis       DISPOSITION  ADMISSION    Discussed treatment plan and reason for admission with pt/family and admitting physician.  Pt/family voiced understanding of the plan for admission for further testing/treatment as needed.     Latest Documented Vital Signs:  As of 10:48 PM  BP- (!) 192/83 HR- 95 Temp- (!) 100.7 °F (38.2 °C) (Tympanic) O2 sat- 97%    --  Documentation assistance provided by anthony Burgess for Dr. Drake.  Information recorded by the scribhunter was done at my direction and has been verified and validated by me.     Edgardo Burgess  05/21/18 9628       Kota Drake MD  05/21/18 3322

## 2018-05-22 NOTE — ANESTHESIA PREPROCEDURE EVALUATION
Anesthesia Evaluation     Patient summary reviewed and Nursing notes reviewed                Airway   Mallampati: II  Dental      Pulmonary    (+) a smoker Current,   Cardiovascular - negative cardio ROS    ECG reviewed  Rhythm: regular  Rate: normal        Neuro/Psych- negative ROS  GI/Hepatic/Renal/Endo - negative ROS     Musculoskeletal (-) negative ROS    Abdominal    Substance History - negative use     OB/GYN negative ob/gyn ROS         Other                        Anesthesia Plan    ASA 2     general     intravenous induction   Anesthetic plan and risks discussed with patient.

## 2018-05-23 VITALS
WEIGHT: 260 LBS | HEART RATE: 64 BPM | RESPIRATION RATE: 16 BRPM | TEMPERATURE: 98.7 F | HEIGHT: 76 IN | BODY MASS INDEX: 31.66 KG/M2 | OXYGEN SATURATION: 95 % | SYSTOLIC BLOOD PRESSURE: 152 MMHG | DIASTOLIC BLOOD PRESSURE: 87 MMHG

## 2018-05-23 LAB
ANION GAP SERPL CALCULATED.3IONS-SCNC: 13.8 MMOL/L
BACTERIA SPEC AEROBE CULT: NORMAL
BACTERIA SPEC AEROBE CULT: NORMAL
BUN BLD-MCNC: 16 MG/DL (ref 6–20)
BUN/CREAT SERPL: 17.2 (ref 7–25)
CALCIUM SPEC-SCNC: 8.8 MG/DL (ref 8.6–10.5)
CHLORIDE SERPL-SCNC: 103 MMOL/L (ref 98–107)
CO2 SERPL-SCNC: 23.2 MMOL/L (ref 22–29)
CREAT BLD-MCNC: 0.93 MG/DL (ref 0.76–1.27)
DEPRECATED RDW RBC AUTO: 39.3 FL (ref 37–54)
ERYTHROCYTE [DISTWIDTH] IN BLOOD BY AUTOMATED COUNT: 12.7 % (ref 11.5–14.5)
GFR SERPL CREATININE-BSD FRML MDRD: 104 ML/MIN/1.73
GLUCOSE BLD-MCNC: 157 MG/DL (ref 65–99)
HCT VFR BLD AUTO: 38.4 % (ref 40.4–52.2)
HGB BLD-MCNC: 12.3 G/DL (ref 13.7–17.6)
MCH RBC QN AUTO: 27.3 PG (ref 27–32.7)
MCHC RBC AUTO-ENTMCNC: 32 G/DL (ref 32.6–36.4)
MCV RBC AUTO: 85.3 FL (ref 79.8–96.2)
PLATELET # BLD AUTO: 220 10*3/MM3 (ref 140–500)
PMV BLD AUTO: 9.9 FL (ref 6–12)
POTASSIUM BLD-SCNC: 4.4 MMOL/L (ref 3.5–5.2)
RBC # BLD AUTO: 4.5 10*6/MM3 (ref 4.6–6)
SODIUM BLD-SCNC: 140 MMOL/L (ref 136–145)
WBC NRBC COR # BLD: 10.58 10*3/MM3 (ref 4.5–10.7)

## 2018-05-23 PROCEDURE — 85027 COMPLETE CBC AUTOMATED: CPT | Performed by: HOSPITALIST

## 2018-05-23 PROCEDURE — 80048 BASIC METABOLIC PNL TOTAL CA: CPT | Performed by: HOSPITALIST

## 2018-05-23 RX ORDER — OXYCODONE HYDROCHLORIDE AND ACETAMINOPHEN 5; 325 MG/1; MG/1
1 TABLET ORAL EVERY 4 HOURS PRN
Qty: 16 TABLET | Refills: 0 | Status: SHIPPED | OUTPATIENT
Start: 2018-05-23 | End: 2018-06-01

## 2018-05-23 RX ORDER — PHENAZOPYRIDINE HYDROCHLORIDE 200 MG/1
TABLET, FILM COATED ORAL
Start: 2018-05-23 | End: 2020-02-27

## 2018-05-23 RX ORDER — SULFAMETHOXAZOLE AND TRIMETHOPRIM 800; 160 MG/1; MG/1
TABLET ORAL
Start: 2018-05-23 | End: 2020-02-27

## 2018-05-23 NOTE — PROGRESS NOTES
"   LOS: 2 days   Patient Care Team:  CAMELIA Giraldo MD as PCP - General  CAMELIA Giraldo MD as PCP - Family Medicine    Chief Complaint/ Reason for encounter: Acute renal failure, obstructive uropathy  Chief Complaint   Patient presents with   • Flank Pain   • Fatigue   • Fever         Subjective     Medical history reviewed:  History of Present Illness    Subjective:  Symptoms:  Improved.  No shortness of breath or chest pain.  (He feels well today  Tolerated cystoscopy and stent placement well).    Diet:  Adequate intake.    Activity level: Impaired due to weakness.    Pain:  He reports no pain.          History taken from: Patient and chart    Objective     Vital Signs  Temp:  [97.7 °F (36.5 °C)-98.8 °F (37.1 °C)] 97.7 °F (36.5 °C)  Heart Rate:  [57-74] 57  Resp:  [16] 16  BP: (112-147)/(54-91) 147/72       Wt Readings from Last 1 Encounters:   05/21/18 1936 118 kg (260 lb)       Objective:  General Appearance:  Comfortable, well-appearing, in no acute distress and not in pain.    Vital signs: (most recent): Blood pressure 147/72, pulse 57, temperature 97.7 °F (36.5 °C), temperature source Oral, resp. rate 16, height 193 cm (76\"), weight 118 kg (260 lb), SpO2 96 %.  Vital signs are normal.    Output: Producing urine.    HEENT: Normal HEENT exam.    Lungs:  Normal effort and normal respiratory rate.  He is not in respiratory distress.  No decreased breath sounds.    Heart: Normal rate.  Regular rhythm.  S1 normal.    Abdomen: Abdomen is soft.  There is no abdominal tenderness.     Neurological: Patient is alert and oriented to person, place and time.    Skin:  Warm and dry.  No rash or cyanosis.             Results Review:    Intake/Output:     Intake/Output Summary (Last 24 hours) at 05/23/18 1138  Last data filed at 05/22/18 1453   Gross per 24 hour   Intake             1240 ml   Output              250 ml   Net              990 ml         DATA:  Radiology and Labs:  The following labs independently reviewed by " me. Additional labs ordered for tomorrow a.m.  Interval notes, chart personally reviewed by me.   Old records independently reviewed showing Previously normal renal function, no hypertension  The following radiologic studies independently viewed by me, findings KUB showing stent in place  New problems include n/a  Discussed with patient himself      Risk/ complexity of medical care/ medical decision making moderate      Labs:   Recent Results (from the past 24 hour(s))   CBC (No Diff)    Collection Time: 05/23/18  4:50 AM   Result Value Ref Range    WBC 10.58 4.50 - 10.70 10*3/mm3    RBC 4.50 (L) 4.60 - 6.00 10*6/mm3    Hemoglobin 12.3 (L) 13.7 - 17.6 g/dL    Hematocrit 38.4 (L) 40.4 - 52.2 %    MCV 85.3 79.8 - 96.2 fL    MCH 27.3 27.0 - 32.7 pg    MCHC 32.0 (L) 32.6 - 36.4 g/dL    RDW 12.7 11.5 - 14.5 %    RDW-SD 39.3 37.0 - 54.0 fl    MPV 9.9 6.0 - 12.0 fL    Platelets 220 140 - 500 10*3/mm3   Basic Metabolic Panel    Collection Time: 05/23/18  4:50 AM   Result Value Ref Range    Glucose 157 (H) 65 - 99 mg/dL    BUN 16 6 - 20 mg/dL    Creatinine 0.93 0.76 - 1.27 mg/dL    Sodium 140 136 - 145 mmol/L    Potassium 4.4 3.5 - 5.2 mmol/L    Chloride 103 98 - 107 mmol/L    CO2 23.2 22.0 - 29.0 mmol/L    Calcium 8.8 8.6 - 10.5 mg/dL    eGFR  African Amer 104 >60 mL/min/1.73    BUN/Creatinine Ratio 17.2 7.0 - 25.0    Anion Gap 13.8 mmol/L       Radiology:  Imaging Results (last 24 hours)     Procedure Component Value Units Date/Time    XR Abdomen 1 View [[id number]] Collected:  05/22/18 1527     Updated:  05/22/18 1542    Narrative:       ABDOMEN     Three views were obtained of the left abdomen for intraoperative  localization and control during left ureteral stent placement and  cystoscopy. Fluoroscopy time was 27 seconds.     This report was finalized on 5/22/2018 3:39 PM by Dr. Raj Peter M.D.       FL C Arm During Surgery [[id number]] Resulted:  05/22/18 1352     Updated:  05/22/18 1352    Narrative:       This  procedure was auto-finalized with no dictation required.             Medications have been reviewed:  Current Facility-Administered Medications   Medication Dose Route Frequency Provider Last Rate Last Dose   • acetaminophen (TYLENOL) tablet 650 mg  650 mg Oral Q6H PRN Baron Lay MD       • lactated ringers infusion  9 mL/hr Intravenous Continuous Robb Goldstein MD 9 mL/hr at 05/22/18 1130 9 mL/hr at 05/22/18 1130   • morphine injection 2 mg  2 mg Intravenous Q3H PRN Baron Lay MD   2 mg at 05/22/18 0924   • ondansetron (ZOFRAN) injection 4 mg  4 mg Intravenous Q6H PRN Baron Lay MD       • oxyCODONE-acetaminophen (PERCOCET) 5-325 MG per tablet 1 tablet  1 tablet Oral Q4H PRN Justice Fischer MD   1 tablet at 05/22/18 2016   • phenazopyridine (PYRIDIUM) tablet 200 mg  200 mg Oral TID PRN Baron Graham MD       • sodium chloride 0.9 % flush 10 mL  10 mL Intravenous PRN Kota Drake MD           ASSESSMENT:  Acute renal failure, multifactorial from NSAIDs, dehydration, obstruction, much improved  Nephrolithiasis, status post left ureteral stent  Pharyngitis with fevers  Left hydronephrosis status post left ureteral stent  Leukocytosis, improved  Tobacco abuse  ?  Hypertension, monitor for now, borderline blood pressure readings  Possible urinary tract infection, culture negative so far        PLAN:   His renal function has improved, near baseline after IV fluids and left ureteral stent  Discontinue IV fluids  Patient was cautioned regarding the use of NSAIDs  Eventual lithotripsy   Off antibiotics, urine culture was negative  Stable for discharge from a renal standpoint, follow-up with primary care next week to recheck blood pressure       Sidney Payne MD   [facility]   Office phone number: [phone]  Answering service phone number: [phone]    05/23/18  11:38 AM      Dictation performed using Dragon dictation software

## 2018-05-23 NOTE — PLAN OF CARE
Problem: Patient Care Overview  Goal: Plan of Care Review  Outcome: Ongoing (interventions implemented as appropriate)   05/23/18 1217   Coping/Psychosocial   Plan of Care Reviewed With patient   Plan of Care Review   Progress improving   OTHER   Outcome Summary Pt is up and walking around waiting on MD to see possible D/C today will cont to monitor.       Problem: Infection, Risk/Actual (Adult)  Goal: Identify Related Risk Factors and Signs and Symptoms  Outcome: Ongoing (interventions implemented as appropriate)    Goal: Infection Prevention/Resolution  Outcome: Ongoing (interventions implemented as appropriate)      Problem: Pain, Acute (Adult)  Goal: Identify Related Risk Factors and Signs and Symptoms  Outcome: Ongoing (interventions implemented as appropriate)    Goal: Acceptable Pain Control/Comfort Level  Outcome: Ongoing (interventions implemented as appropriate)

## 2018-05-23 NOTE — DISCHARGE SUMMARY
[facility]    Date of Discharge:  5/23/2018    PCP: JUVENTINO Giraldo MD    Discharge Diagnosis:   Active Hospital Problems (** Indicates Principal Problem)    Diagnosis Date Noted   • **Nephrolithiasis [N20.0] 05/21/2018   • Exudative pharyngitis [J02.9] 05/22/2018   • WOJCIECH (acute kidney injury) [N17.9] 05/22/2018   • Hydronephrosis [N13.30] 05/22/2018   • Leukocytosis [D72.829] 05/22/2018   • Tobacco abuse [Z72.0] 05/22/2018      Resolved Hospital Problems    Diagnosis Date Noted Date Resolved   No resolved problems to display.     Procedures Performed  Procedure(s):  CYSTOSCOPY URETERAL CATHETER/STENT INSERTION     Consults     Date and Time Order Name Status Description    5/22/2018 0947 Inpatient Nephrology Consult Completed     5/22/2018 0123 Inpatient Urology Consult      5/21/2018 2235 [facility] (on-call MD unless specified) Completed     5/21/2018 2202 Urology (on-call MD unless specified) Completed     5/17/2018 0759 Urology (on-call MD unless specified) Completed         Hospital Course  Please see history and physical for details. Patient is a 52 y.o. male admitted with ureteral stone and pyuria and fever and exudative pharyngitis. He was started on antibiotics and underwent cystoscopy and left stent on 5/22/18. Today he feels significantly improved. His pharyngitis symptoms are improved and the exudate on exam has resolved. He is tolerating by mouth without trouble. His sore throat is resolved. Respiratory panel was negative. Strep screen was negative. Strep culture is negative. His leukocytosis resolved. He also had acute kidney injury due to dehydration, obstruction, NSAIDs. He was cautioned regarding the use of NSAIDs. Urology gave him a prescription for Lortab, Pyridium, Septra. He states he was not able to sleep on Lortab previously and did better with Percocet here. I advised him on minimizing the use of that and trying to use Tylenol instead and he  plans to use mostly Tylenol and reserve the Percocet for severe pain. He states when he had hip replacement surgery he only took Tylenol 3 sparingly.    I discussed the patient's findings and my recommendations with patient, family and nursing staff.    Condition on Discharge: Improved. She would like to go home today.    Temp:  [97.7 °F (36.5 °C)-98.7 °F (37.1 °C)] 98.7 °F (37.1 °C)  Heart Rate:  [57-72] 64  Resp:  [16] 16  BP: (134-152)/(72-87) 152/87  Body mass index is 31.65 kg/m².    Physical Exam   Constitutional: He is oriented to person, place, and time. No distress.   HENT:   Mouth/Throat: Posterior oropharyngeal erythema (mild) present. No oropharyngeal exudate.   Cardiovascular: Normal rate and regular rhythm.    Pulmonary/Chest: Effort normal and breath sounds normal. No respiratory distress.   Abdominal: Soft. There is no tenderness.   Neurological: He is alert and oriented to person, place, and time.   Skin: Skin is warm and dry.     Discharge Medications   Walker Aldana   Home Medication Instructions HAN:[id number]    Printed on:05/23/18 2762   Medication Information                      oxyCODONE-acetaminophen (PERCOCET) 5-325 MG per tablet  Take 1 tablet by mouth Every 4 (Four) Hours As Needed for Moderate Pain  for up to 9 days.             phenazopyridine (PYRIDIUM) 200 MG tablet  Prescription written by urology             sulfamethoxazole-trimethoprim (BACTRIM DS) 800-160 MG per tablet  Prescription written per urology                Diet Instructions     Diet: Regular       Discharge Diet:  Regular         Activity Instructions     Activity as Tolerated            Additional Instructions for the Follow-ups that You Need to Schedule     Call MD for problems / concerns.    As directed        Follow-up Information     JUVENTINO Giraldo MD Follow up in 1 week(s).    Specialty:  General Practice  Contact information:  [address] [place], [place] [id number] [phone]              Baron Graham MD Follow up.    Specialty:  Urology  Contact information:  [address] [place] [address] [place] [address] [phone]                 Test Results Pending at Discharge   Order Current Status    Blood Culture - Blood, Preliminary result    Blood Culture - Blood, Preliminary result         Justice Ficsher MD  05/23/18  4:18 PM    Discharge time spent greater than 30 minutes.

## 2018-05-23 NOTE — PROGRESS NOTES
"Cc michael and ronnieo  Doing well with stent strep culture neg  Urine culture pending. Creat normal and wbc normal  Mild stent pain  No nvfc  vssaf    LOS: 2 days   Patient Care Team:  CAMELIA Giraldo MD as PCP - General  CAMELIA Giraldo MD as PCP - Family Medicine        Subjective     History of Present Illness    Subjective      Objective     Vital Signs  Temp:  [98.3 °F (36.8 °C)-98.8 °F (37.1 °C)] 98.3 °F (36.8 °C)  Heart Rate:  [66-76] 71  Resp:  [16-20] 16  BP: (112-144)/() 139/74    Objective:  General Appearance:  Comfortable.    Vital signs: (most recent): Blood pressure 139/74, pulse 71, temperature 98.3 °F (36.8 °C), temperature source Oral, resp. rate 16, height 193 cm (76\"), weight 118 kg (260 lb), SpO2 93 %.  Vital signs are normal.    Output: Producing urine.    HEENT: Normal HEENT exam.    Lungs:  Normal effort and normal respiratory rate.    Abdomen: Abdomen is soft.              Results Review:  New clinical results reviewed.        Assessment/Plan     Principal Problem:    Nephrolithiasis  Active Problems:    Exudative pharyngitis    WOJCIECH (acute kidney injury)    Hydronephrosis    Leukocytosis    Tobacco abuse      Assessment:  (Doing well after stent  Ok to dc and I will fu with urine culture to make sure he is on appropriate abx. He has an rx for septra norco and pyridium).       Baron Graham MD  05/23/18  6:43 AM            "

## 2018-05-23 NOTE — PLAN OF CARE
Problem: Patient Care Overview  Goal: Plan of Care Review  Outcome: Ongoing (interventions implemented as appropriate)   05/23/18 3237   Coping/Psychosocial   Plan of Care Reviewed With patient   Plan of Care Review   Progress improving   OTHER   Outcome Summary Patient c/o flank pain, medicated with oral meds. IVF continued per order. Family at bedside. VSS, will continue to monitor     Goal: Discharge Needs Assessment  Outcome: Ongoing (interventions implemented as appropriate)    Goal: Interprofessional Rounds/Family Conf  Outcome: Ongoing (interventions implemented as appropriate)      Problem: Infection, Risk/Actual (Adult)  Goal: Identify Related Risk Factors and Signs and Symptoms  Outcome: Ongoing (interventions implemented as appropriate)    Goal: Infection Prevention/Resolution  Outcome: Ongoing (interventions implemented as appropriate)      Problem: Pain, Acute (Adult)  Goal: Identify Related Risk Factors and Signs and Symptoms  Outcome: Ongoing (interventions implemented as appropriate)    Goal: Acceptable Pain Control/Comfort Level  Outcome: Ongoing (interventions implemented as appropriate)

## 2018-05-24 NOTE — PROGRESS NOTES
Case Management Discharge Note    Final Note: DC home via private auto. Beatriz Coats RN    Destination     No service coordination in this encounter.      Durable Medical Equipment     No service coordination in this encounter.      Dialysis/Infusion     No service coordination in this encounter.      Home Medical Care     No service coordination in this encounter.      Social Care     No service coordination in this encounter.        Other: Other (private auto)    Final Discharge Disposition Code: 01 - home or self-care

## 2018-05-24 NOTE — PAYOR COMM NOTE
"Vickie Aldana (52 y.o. Male)     PLEASE SEE ATTACHED DC SUMMARY    REF#[id number]    THANK YOU    BIJU CUEVAS LPN, CCP    Date of Birth Social Security Number Address Home Phone MRN    1965  [address] [phone] [id number]    [place] Marital Status          [employer]        Admission Date Admission Type Admitting Provider Attending Provider Department, Room/Bed    5/21/18 Emergency Justice Fischer MD  [address], 611/1    Discharge Date Discharge Disposition Discharge Destination        5/23/2018 Home or Self Care              Attending Provider:  (none)   Allergies:  Bupropion    Isolation:  None   Infection:  None   Code Status:  Prior    Ht:  193 cm (76\")   Wt:  118 kg (260 lb)    Admission Cmt:  None   Principal Problem:  Nephrolithiasis [N20.0]                 Active Insurance as of 5/21/2018     Primary Coverage     Payor Plan Insurance Group Employer/Plan Group    [employer]HEM BLUE CROSS ANTHEM BLUE CROSS BLUE SHIELD O [id number]     Payor Plan Address Payor Plan Phone Number Effective From Effective To    [address] [phone] 1/1/2016     [facility] [id number]       Subscriber Name Subscriber Birth Date Member ID       VICKIE ALDANA 1965 [id number]                 Emergency Contacts      (Rel.) Home Phone Work Phone Mobile Phone    Dali Aldana (Spouse) [phone] -- --               Discharge Summary      Justice Fischer MD at 5/23/2018  4:06 PM                              [place] [facility]ITAL[facility]               [facility]    Date of Discharge:  5/23/2018    PCP: JUVENTINO Giraldo MD    Discharge Diagnosis:   Active Hospital Problems (** Indicates Principal Problem)    Diagnosis Date Noted   • **Nephrolithiasis [N20.0] 05/21/2018   • Exudative pharyngitis [J02.9] 05/22/2018   • WOJCIECH (acute kidney injury) [N17.9] 05/22/2018   • Hydronephrosis [N13.30] 05/22/2018   • Leukocytosis [D72.829] 05/22/2018   • " Tobacco abuse [Z72.0] 05/22/2018      Resolved Hospital Problems    Diagnosis Date Noted Date Resolved   No resolved problems to display.     Procedures Performed  Procedure(s):  CYSTOSCOPY URETERAL CATHETER/STENT INSERTION     Consults     Date and Time Order Name Status Description    5/22/2018 0947 Inpatient Nephrology Consult Completed     5/22/2018 0123 Inpatient Urology Consult      5/21/2018 6155 [facility] (on-call MD unless specified) Completed     5/21/2018 2082 Urology (on-call MD unless specified) Completed     5/17/2018 7169 Urology (on-call MD unless specified) Completed         Hospital Course  Please see history and physical for details. Patient is a 52 y.o. male admitted with ureteral stone and pyuria and fever and exudative pharyngitis. He was started on antibiotics and underwent cystoscopy and left stent on 5/22/18. Today he feels significantly improved. His pharyngitis symptoms are improved and the exudate on exam has resolved. He is tolerating by mouth without trouble. His sore throat is resolved. Respiratory panel was negative. Strep screen was negative. Strep culture is negative. His leukocytosis resolved. He also had acute kidney injury due to dehydration, obstruction, NSAIDs. He was cautioned regarding the use of NSAIDs. Urology gave him a prescription for Lortab, Pyridium, Septra. He states he was not able to sleep on Lortab previously and did better with Percocet here. I advised him on minimizing the use of that and trying to use Tylenol instead and he plans to use mostly Tylenol and reserve the Percocet for severe pain. He states when he had hip replacement surgery he only took Tylenol 3 sparingly.    I discussed the patient's findings and my recommendations with patient, family and nursing staff.    Condition on Discharge: Improved. She would like to go home today.    Temp:  [97.7 °F (36.5 °C)-98.7 °F (37.1 °C)] 98.7 °F (37.1 °C)  Heart Rate:  [57-72] 64  Resp:  [16] 16  BP: (134-152)/(72-87)  152/87  Body mass index is 31.65 kg/m².    Physical Exam   Constitutional: He is oriented to person, place, and time. No distress.   HENT:   Mouth/Throat: Posterior oropharyngeal erythema (mild) present. No oropharyngeal exudate.   Cardiovascular: Normal rate and regular rhythm.    Pulmonary/Chest: Effort normal and breath sounds normal. No respiratory distress.   Abdominal: Soft. There is no tenderness.   Neurological: He is alert and oriented to person, place, and time.   Skin: Skin is warm and dry.     Discharge Medications   Walker Aldana   Home Medication Instructions HAN:[id number]    Printed on:05/23/18 1618   Medication Information                      oxyCODONE-acetaminophen (PERCOCET) 5-325 MG per tablet  Take 1 tablet by mouth Every 4 (Four) Hours As Needed for Moderate Pain  for up to 9 days.             phenazopyridine (PYRIDIUM) 200 MG tablet  Prescription written by urology             sulfamethoxazole-trimethoprim (BACTRIM DS) 800-160 MG per tablet  Prescription written per urology                Diet Instructions     Diet: Regular       Discharge Diet:  Regular         Activity Instructions     Activity as Tolerated            Additional Instructions for the Follow-ups that You Need to Schedule     Call MD for problems / concerns.    As directed        Follow-up Information     JUVENTINO Giraldo MD Follow up in 1 week(s).    Specialty:  General Practice  Contact information:  [address] [place] [address]  [phone]             Baron Graham MD Follow up.    Specialty:  Urology  Contact information:  [address] [place] [id number] [phone]                 Test Results Pending at Discharge   Order Current Status    Blood Culture - Blood, Preliminary result    Blood Culture - Blood, Preliminary result         Justice Fischer MD  05/23/18  4:18 PM    Discharge time spent greater than 30 minutes.      Electronically signed by Justice Fischer MD at  5/23/2018  4:18 PM

## 2018-05-26 LAB
BACTERIA SPEC AEROBE CULT: NORMAL
BACTERIA SPEC AEROBE CULT: NORMAL

## 2020-02-27 ENCOUNTER — OFFICE VISIT (OUTPATIENT)
Dept: CARDIOLOGY | Facility: CLINIC | Age: 55
End: 2020-02-27

## 2020-02-27 VITALS
DIASTOLIC BLOOD PRESSURE: 89 MMHG | HEIGHT: 77 IN | SYSTOLIC BLOOD PRESSURE: 133 MMHG | BODY MASS INDEX: 30.11 KG/M2 | WEIGHT: 255 LBS | HEART RATE: 85 BPM

## 2020-02-27 DIAGNOSIS — R94.31 ABNORMAL EKG: Primary | ICD-10-CM

## 2020-02-27 DIAGNOSIS — R03.0 BORDERLINE SYSTOLIC HTN: ICD-10-CM

## 2020-02-27 DIAGNOSIS — E66.3 OVERWEIGHT: ICD-10-CM

## 2020-02-27 DIAGNOSIS — R06.02 SOB (SHORTNESS OF BREATH): ICD-10-CM

## 2020-02-27 DIAGNOSIS — I45.9 SKIPPED HEART BEATS: ICD-10-CM

## 2020-02-27 PROCEDURE — 99203 OFFICE O/P NEW LOW 30 MIN: CPT | Performed by: INTERNAL MEDICINE

## 2020-03-16 ENCOUNTER — HOSPITAL ENCOUNTER (OUTPATIENT)
Dept: CARDIOLOGY | Facility: HOSPITAL | Age: 55
Discharge: HOME | End: 2020-03-16
Admitting: INTERNAL MEDICINE

## 2020-03-16 ENCOUNTER — HOSPITAL ENCOUNTER (OUTPATIENT)
Dept: CARDIOLOGY | Facility: HOSPITAL | Age: 55
Discharge: HOME | End: 2020-03-16

## 2020-03-16 ENCOUNTER — OFFICE VISIT (OUTPATIENT)
Dept: CARDIOLOGY | Facility: CLINIC | Age: 55
End: 2020-03-16

## 2020-03-16 VITALS
HEART RATE: 72 BPM | HEIGHT: 77 IN | DIASTOLIC BLOOD PRESSURE: 74 MMHG | BODY MASS INDEX: 32.23 KG/M2 | WEIGHT: 273 LBS | SYSTOLIC BLOOD PRESSURE: 175 MMHG

## 2020-03-16 VITALS
BODY MASS INDEX: 30.11 KG/M2 | HEART RATE: 76 BPM | HEIGHT: 77 IN | DIASTOLIC BLOOD PRESSURE: 82 MMHG | SYSTOLIC BLOOD PRESSURE: 161 MMHG | WEIGHT: 255 LBS

## 2020-03-16 VITALS — BODY MASS INDEX: 30.11 KG/M2 | HEIGHT: 77 IN | WEIGHT: 255 LBS

## 2020-03-16 DIAGNOSIS — R03.0 BORDERLINE SYSTOLIC HTN: Primary | ICD-10-CM

## 2020-03-16 LAB
BH CV ECHO MEAS - ACS: 2.3 CM
BH CV ECHO MEAS - AO ARCH DIAM (PROXIMAL TRANS.): 3 CM
BH CV ECHO MEAS - AO MAX PG (FULL): 2.1 MMHG
BH CV ECHO MEAS - AO MAX PG: 6.7 MMHG
BH CV ECHO MEAS - AO MEAN PG (FULL): 0 MMHG
BH CV ECHO MEAS - AO MEAN PG: 3 MMHG
BH CV ECHO MEAS - AO ROOT AREA (BSA CORRECTED): 1.5
BH CV ECHO MEAS - AO ROOT AREA: 10.2 CM^2
BH CV ECHO MEAS - AO ROOT DIAM: 3.6 CM
BH CV ECHO MEAS - AO V2 MAX: 129 CM/SEC
BH CV ECHO MEAS - AO V2 MEAN: 88.1 CM/SEC
BH CV ECHO MEAS - AO V2 VTI: 26.8 CM
BH CV ECHO MEAS - ASC AORTA: 3 CM
BH CV ECHO MEAS - AVA(I,A): 3.8 CM^2
BH CV ECHO MEAS - AVA(I,D): 3.8 CM^2
BH CV ECHO MEAS - AVA(V,A): 4.1 CM^2
BH CV ECHO MEAS - AVA(V,D): 4.1 CM^2
BH CV ECHO MEAS - BSA(HAYCOCK): 2.5 M^2
BH CV ECHO MEAS - BSA: 2.5 M^2
BH CV ECHO MEAS - BZI_BMI: 30.2 KILOGRAMS/M^2
BH CV ECHO MEAS - BZI_METRIC_HEIGHT: 195.6 CM
BH CV ECHO MEAS - BZI_METRIC_WEIGHT: 115.7 KG
BH CV ECHO MEAS - EDV(CUBED): 103.8 ML
BH CV ECHO MEAS - EDV(MOD-SP2): 135 ML
BH CV ECHO MEAS - EDV(MOD-SP4): 171 ML
BH CV ECHO MEAS - EDV(TEICH): 102.4 ML
BH CV ECHO MEAS - EF(CUBED): 71.3 %
BH CV ECHO MEAS - EF(MOD-BP): 62 %
BH CV ECHO MEAS - EF(MOD-SP2): 58.5 %
BH CV ECHO MEAS - EF(MOD-SP4): 63.7 %
BH CV ECHO MEAS - EF(TEICH): 63 %
BH CV ECHO MEAS - ESV(CUBED): 29.8 ML
BH CV ECHO MEAS - ESV(MOD-SP2): 56 ML
BH CV ECHO MEAS - ESV(MOD-SP4): 62 ML
BH CV ECHO MEAS - ESV(TEICH): 37.9 ML
BH CV ECHO MEAS - FS: 34 %
BH CV ECHO MEAS - IVS/LVPW: 0.85
BH CV ECHO MEAS - IVSD: 1.1 CM
BH CV ECHO MEAS - LA DIMENSION: 4.3 CM
BH CV ECHO MEAS - LA/AO: 1.2
BH CV ECHO MEAS - LAT PEAK E' VEL: 9.9 CM/SEC
BH CV ECHO MEAS - LV DIASTOLIC VOL/BSA (35-75): 69 ML/M^2
BH CV ECHO MEAS - LV MASS(C)D: 212 GRAMS
BH CV ECHO MEAS - LV MASS(C)DI: 85.5 GRAMS/M^2
BH CV ECHO MEAS - LV MAX PG: 4.6 MMHG
BH CV ECHO MEAS - LV MEAN PG: 3 MMHG
BH CV ECHO MEAS - LV SYSTOLIC VOL/BSA (12-30): 25 ML/M^2
BH CV ECHO MEAS - LV V1 MAX: 107 CM/SEC
BH CV ECHO MEAS - LV V1 MEAN: 74.6 CM/SEC
BH CV ECHO MEAS - LV V1 VTI: 21 CM
BH CV ECHO MEAS - LVIDD: 4.7 CM
BH CV ECHO MEAS - LVIDS: 3.1 CM
BH CV ECHO MEAS - LVLD AP2: 9.5 CM
BH CV ECHO MEAS - LVLD AP4: 9.7 CM
BH CV ECHO MEAS - LVLS AP2: 7.7 CM
BH CV ECHO MEAS - LVLS AP4: 8 CM
BH CV ECHO MEAS - LVOT AREA (M): 4.9 CM^2
BH CV ECHO MEAS - LVOT AREA: 4.9 CM^2
BH CV ECHO MEAS - LVOT DIAM: 2.5 CM
BH CV ECHO MEAS - LVPWD: 1.3 CM
BH CV ECHO MEAS - MED PEAK E' VEL: 8.3 CM/SEC
BH CV ECHO MEAS - MV A DUR: 0.22 SEC
BH CV ECHO MEAS - MV A MAX VEL: 74.7 CM/SEC
BH CV ECHO MEAS - MV DEC SLOPE: 383 CM/SEC^2
BH CV ECHO MEAS - MV DEC TIME: 0.19 SEC
BH CV ECHO MEAS - MV E MAX VEL: 81 CM/SEC
BH CV ECHO MEAS - MV E/A: 1.1
BH CV ECHO MEAS - MV MAX PG: 3.2 MMHG
BH CV ECHO MEAS - MV MEAN PG: 1 MMHG
BH CV ECHO MEAS - MV P1/2T MAX VEL: 90.9 CM/SEC
BH CV ECHO MEAS - MV P1/2T: 69.5 MSEC
BH CV ECHO MEAS - MV V2 MAX: 88.9 CM/SEC
BH CV ECHO MEAS - MV V2 MEAN: 55.2 CM/SEC
BH CV ECHO MEAS - MV V2 VTI: 21.7 CM
BH CV ECHO MEAS - MVA P1/2T LCG: 2.4 CM^2
BH CV ECHO MEAS - MVA(P1/2T): 3.2 CM^2
BH CV ECHO MEAS - MVA(VTI): 4.8 CM^2
BH CV ECHO MEAS - PA ACC TIME: 0.08 SEC
BH CV ECHO MEAS - PA MAX PG (FULL): 2.7 MMHG
BH CV ECHO MEAS - PA MAX PG: 4 MMHG
BH CV ECHO MEAS - PA PR(ACCEL): 43.5 MMHG
BH CV ECHO MEAS - PA V2 MAX: 100 CM/SEC
BH CV ECHO MEAS - PULM A REVS DUR: 0.15 SEC
BH CV ECHO MEAS - PULM A REVS VEL: 32.9 CM/SEC
BH CV ECHO MEAS - PULM DIAS VEL: 45.4 CM/SEC
BH CV ECHO MEAS - PULM S/D: 0.98
BH CV ECHO MEAS - PULM SYS VEL: 44.6 CM/SEC
BH CV ECHO MEAS - PVA(V,A): 3.5 CM^2
BH CV ECHO MEAS - PVA(V,D): 3.5 CM^2
BH CV ECHO MEAS - QP/QS: 0.72
BH CV ECHO MEAS - RV MAX PG: 1.3 MMHG
BH CV ECHO MEAS - RV MEAN PG: 1 MMHG
BH CV ECHO MEAS - RV V1 MAX: 57.6 CM/SEC
BH CV ECHO MEAS - RV V1 MEAN: 41.2 CM/SEC
BH CV ECHO MEAS - RV V1 VTI: 12.1 CM
BH CV ECHO MEAS - RVOT AREA: 6.2 CM^2
BH CV ECHO MEAS - RVOT DIAM: 2.8 CM
BH CV ECHO MEAS - SI(AO): 110 ML/M^2
BH CV ECHO MEAS - SI(CUBED): 29.9 ML/M^2
BH CV ECHO MEAS - SI(LVOT): 41.6 ML/M^2
BH CV ECHO MEAS - SI(MOD-SP2): 31.9 ML/M^2
BH CV ECHO MEAS - SI(MOD-SP4): 44 ML/M^2
BH CV ECHO MEAS - SI(TEICH): 26 ML/M^2
BH CV ECHO MEAS - SV(AO): 272.8 ML
BH CV ECHO MEAS - SV(CUBED): 74 ML
BH CV ECHO MEAS - SV(LVOT): 103.1 ML
BH CV ECHO MEAS - SV(MOD-SP2): 79 ML
BH CV ECHO MEAS - SV(MOD-SP4): 109 ML
BH CV ECHO MEAS - SV(RVOT): 74.5 ML
BH CV ECHO MEAS - SV(TEICH): 64.4 ML
BH CV ECHO MEAS - TAPSE (>1.6): 2.9 CM
BH CV ECHO MEASUREMENTS AVERAGE E/E' RATIO: 8.9
BH CV STRESS BP STAGE 1: NORMAL
BH CV STRESS BP STAGE 2: NORMAL
BH CV STRESS BP STAGE 3: NORMAL
BH CV STRESS DURATION MIN STAGE 1: 3
BH CV STRESS DURATION MIN STAGE 2: 3
BH CV STRESS DURATION MIN STAGE 3: 1
BH CV STRESS DURATION SEC STAGE 1: 0
BH CV STRESS DURATION SEC STAGE 2: 0
BH CV STRESS DURATION SEC STAGE 3: 56
BH CV STRESS GRADE STAGE 1: 10
BH CV STRESS GRADE STAGE 2: 12
BH CV STRESS GRADE STAGE 3: 14
BH CV STRESS HR STAGE 1: 105
BH CV STRESS HR STAGE 2: 125
BH CV STRESS HR STAGE 3: 173
BH CV STRESS METS STAGE 1: 4.6
BH CV STRESS METS STAGE 2: 7
BH CV STRESS METS STAGE 3: 9.9
BH CV STRESS PROTOCOL 1: NORMAL
BH CV STRESS RECOVERY BP: NORMAL MMHG
BH CV STRESS RECOVERY HR: 86 BPM
BH CV STRESS RECOVERY O2: 98 %
BH CV STRESS SPEED STAGE 1: 1.7
BH CV STRESS SPEED STAGE 2: 2.5
BH CV STRESS SPEED STAGE 3: 3.4
BH CV STRESS STAGE 1: 1
BH CV STRESS STAGE 2: 2
BH CV STRESS STAGE 3: 3
BH CV XLRA - RV BASE: 3.9 CM
BH CV XLRA - RV LENGTH: 10.3 CM
BH CV XLRA - RV MID: 3.1 CM
BH CV XLRA - TDI S': 15.6 CM/SEC
LEFT ATRIUM VOLUME INDEX: 34 ML/M2
LV EF NUC BP: 60 %
MAXIMAL PREDICTED HEART RATE: 168 BPM
MAXIMAL PREDICTED HEART RATE: 168 BPM
PERCENT MAX PREDICTED HR: 102.98 %
STRESS BASELINE BP: NORMAL MMHG
STRESS BASELINE HR: 82 BPM
STRESS O2 SAT REST: 98 %
STRESS PERCENT HR: 121 %
STRESS POST ESTIMATED WORKLOAD: 9.9 METS
STRESS POST EXERCISE DUR MIN: 7 MIN
STRESS POST EXERCISE DUR SEC: 56 SEC
STRESS POST PEAK BP: NORMAL MMHG
STRESS POST PEAK HR: 173 BPM
STRESS TARGET HR: 143 BPM
STRESS TARGET HR: 143 BPM

## 2020-03-16 PROCEDURE — 78452 HT MUSCLE IMAGE SPECT MULT: CPT

## 2020-03-16 PROCEDURE — 93016 CV STRESS TEST SUPVJ ONLY: CPT | Performed by: INTERNAL MEDICINE

## 2020-03-16 PROCEDURE — 78452 HT MUSCLE IMAGE SPECT MULT: CPT | Performed by: INTERNAL MEDICINE

## 2020-03-16 PROCEDURE — A9500 TC99M SESTAMIBI: HCPCS | Performed by: INTERNAL MEDICINE

## 2020-03-16 PROCEDURE — 93306 TTE W/DOPPLER COMPLETE: CPT

## 2020-03-16 PROCEDURE — 93018 CV STRESS TEST I&R ONLY: CPT | Performed by: INTERNAL MEDICINE

## 2020-03-16 PROCEDURE — 93306 TTE W/DOPPLER COMPLETE: CPT | Performed by: INTERNAL MEDICINE

## 2020-03-16 PROCEDURE — 99213 OFFICE O/P EST LOW 20 MIN: CPT | Performed by: INTERNAL MEDICINE

## 2020-03-16 PROCEDURE — 0 TECHNETIUM SESTAMIBI: Performed by: INTERNAL MEDICINE

## 2020-03-16 PROCEDURE — 93017 CV STRESS TEST TRACING ONLY: CPT

## 2020-03-16 RX ORDER — AMLODIPINE BESYLATE 5 MG/1
5 TABLET ORAL DAILY
Qty: 30 TABLET | Refills: 11 | Status: SHIPPED | OUTPATIENT
Start: 2020-03-16 | End: 2020-04-20 | Stop reason: DRUGHIGH

## 2020-03-16 RX ADMIN — TECHNETIUM TC 99M SESTAMIBI 1 DOSE: 1 INJECTION INTRAVENOUS at 07:14

## 2020-03-16 RX ADMIN — TECHNETIUM TC 99M SESTAMIBI 1 DOSE: 1 INJECTION INTRAVENOUS at 09:24

## 2020-03-16 NOTE — PROGRESS NOTES
Subjective:        [facility]  Cardiology Consult Note    Patient Identification:  Name: Walker Aldana  Age: 54 y.o.  Sex: male  :  1965  MRN: [id number]             CC  bp high        History of Present Illness:   54-year-old male had a stress test done today was found to have blood pressure extremely high echocardiogram and stress test are normal    Comorbid cardiac risk factors:     Past Medical History:  Past Medical History:   Diagnosis Date   • Arthritis    • Asthma    • Kidney stones     has a stent     Past Surgical History:  Past Surgical History:   Procedure Laterality Date   • ADENOIDECTOMY     • APPENDECTOMY     • CYSTOSCOPY W/ URETERAL STENT PLACEMENT Left 2018    Procedure: CYSTOSCOPY URETERAL CATHETER/STENT INSERTION;  Surgeon: Baron Graham MD;  Location: [facility];  Service: Urology   • JOINT REPLACEMENT     • KNEE ARTHROSCOPY Bilateral    • KNEE SURGERY Bilateral    • TONSILLECTOMY     • TOTAL HIP ARTHROPLASTY Left       Allergies:  Allergies   Allergen Reactions   • Bupropion Hives     Home Meds:    (Not in a hospital admission)  Current Meds:   [unfilled]  Social History:   Social History     Tobacco Use   • Smoking status: Current Every Day Smoker     Packs/day: 1.00   • Smokeless tobacco: Never Used   Substance Use Topics   • Alcohol use: Yes     Comment: socially      Family History:  Family History   Problem Relation Age of Onset   • Heart attack Father    • Heart disease Father    • Hypertension Father    • Heart disease Maternal Grandfather    • Arrhythmia Maternal Grandfather    • Hypertension Mother    • Diabetes Mother         Review of Systems    Constitutional: No weakness,fatigue, fever, rigors, chills   Eyes: No vision changes, eye pain   ENT/oropharynx: No difficulty swallowing, sore throat, epistaxis, changes in hearing   Cardiovascular: No chest pain, chest tightness, palpitations, paroxysmal nocturnal dyspnea, orthopnea, diaphoresis,  dizziness / syncopal episode   Respiratory: No shortness of breath, dyspnea on exertion, cough, wheezing hemoptysis   Gastrointestinal: No abdominal pain, nausea, vomiting, diarrhea, bloody stools   Genitourinary: No hematuria, dysuria   Neurological: No headache, tremors, numbness,  one-sided weakness    Musculoskeletal: No cramps, myalgias,  joint pain, joint swelling   Integument: No rash, edema           Constitutional:  Heart Rate:  [72-76] 72  BP: (161-175)/(74-82) 175/74    Physical Exam   General:  Appears in no acute distress  Eyes: PERTL,  HEENT:  No JVD. Thyroid not visibly enlarged. No mucosal pallor or cyanosis  Respiratory: Respirations regular and unlabored at rest. BBS with good air entry in all fields. No crackles, rubs or wheezes auscultated  Cardiovascular: S1S2 Regular rate and rhythm. No murmur, rub or gallop auscultated. No carotid bruits. DP/PT pulses    . No pretibial pitting edema  Gastrointestinal: Abdomen soft, flat, non tender. Bowel sounds present. No hepatosplenomegaly. No ascites  Musculoskeletal: MOORE x4. No abnormal movements  Extremities: No digital clubbing or cyanosis  Skin: Color pink. Skin warm and dry to touch. No rashes  No xanthoma  Neuro: AAO x3 CN II-XII grossly intact          Procedures        Cardiographics  ECG:     Telemetry:    Echocardiogram:     Imaging  Chest X-ray:     Lab Review               @[email or web address]@              Assessment:/ Recommendations / Plan:   Patient Active Problem List   Diagnosis   • Nephrolithiasis   • Exudative pharyngitis   • WOJCIECH (acute kidney injury) (CMS/HCC)   • Hydronephrosis   • Leukocytosis   • Tobacco abuse   • Abnormal EKG   • Borderline systolic HTN   • Overweight   • Skipped heart beats                    ICD-10-CM ICD-9-CM   1. Borderline systolic HTN R03.0 796.2     1. Borderline systolic HTN       Add Norvasc 5 mg 1 once a day to the current medication  strress test and echo ok    Start norvasc 5 mg po daily    seein 1  month    Labs/tests ordered for am      Claudia Velez MD  3/16/2020, 11:19      EMR Dragon/Transcription:   Dictated utilizing Dragon dictation

## 2020-03-23 ENCOUNTER — TELEPHONE (OUTPATIENT)
Dept: CARDIOLOGY | Facility: CLINIC | Age: 55
End: 2020-03-23

## 2020-03-23 NOTE — TELEPHONE ENCOUNTER
----- Message from Leyda Haskins sent at 3/23/2020  9:54 AM EDT -----  Regarding: meds  Contact: [phone]  Pt has developed a bad tooth and is having to have some dental work on it.  Pt is on 2 different pain meds and HAS NOT started on the new BP meds.  Is wanting to know if he should start the BP meds with the pain meds or wait til tooth is taken care of

## 2020-04-20 ENCOUNTER — TELEMEDICINE (OUTPATIENT)
Dept: CARDIOLOGY | Facility: CLINIC | Age: 55
End: 2020-04-20

## 2020-04-20 VITALS — BODY MASS INDEX: 31.29 KG/M2 | HEIGHT: 77 IN | WEIGHT: 265 LBS

## 2020-04-20 DIAGNOSIS — R03.0 BORDERLINE SYSTOLIC HTN: Primary | ICD-10-CM

## 2020-04-20 DIAGNOSIS — Z72.0 TOBACCO USE: ICD-10-CM

## 2020-04-20 PROCEDURE — 99214 OFFICE O/P EST MOD 30 MIN: CPT | Performed by: NURSE PRACTITIONER

## 2020-04-20 PROCEDURE — 99406 BEHAV CHNG SMOKING 3-10 MIN: CPT | Performed by: NURSE PRACTITIONER

## 2020-04-20 RX ORDER — AMLODIPINE BESYLATE 10 MG/1
10 TABLET ORAL DAILY
Qty: 30 TABLET | Refills: 5 | Status: SHIPPED | OUTPATIENT
Start: 2020-04-20 | End: 2020-11-12

## 2020-04-20 NOTE — PROGRESS NOTES
Subjective:        Walker Aldana is a 54 y.o. male who here for follow up    No chief complaint on file.    He is via video today for a follow up for his blood pressure.     HPI     Walker Aldana is a 54 year old male, who is new to this provider. He has a history of hypertension, arthritis, asthma, kidney stones, and diabetes. He was seen approximately one month ago regarding his blood pressure. He was started on Norvasc at that time. His echo revealed EF 62%, and saline bubble test neg for PFO. His stress test showed a normal myocardial perfusion study with no evidence of ischemia. His heart monitored showed NSR with frequent PACs, occasional PVC's and frequent non sustained SVT's.     Denies chest pain, shortness of breath, and palpitations.      The following portions of the patient's history were reviewed and updated as appropriate: allergies, current medications, past family history, past medical history, past social history, past surgical history and problem list.    Past Medical History:   Diagnosis Date   • Arthritis    • Asthma    • Kidney stones     has a stent         reports that he has been smoking. He has been smoking about 1.00 pack per day. He has never used smokeless tobacco. He reports that he drinks alcohol. He reports that he does not use drugs.     Family History   Problem Relation Age of Onset   • Heart attack Father    • Heart disease Father    • Hypertension Father    • Heart disease Maternal Grandfather    • Arrhythmia Maternal Grandfather    • Hypertension Mother    • Diabetes Mother        ROS     Review of Systems   Constitutional: No wt loss, fever, fatigue  Gastrointestinal: No nausea, abdominal pain  Behavioral/Psych: No insomnia or anxiety  Cardiovascular: Denies chest pain, shortness of breath, and syncope      Objective:        This is a video visit and unable to do PE.  Physical Exam    Procedures   Interpretation Summary     · An abnormal monitor study.  · NSR WITH FREQUENT  PACS, OCCASIONAL PVCS, AND FREQUENT NON SUSTAINED SVTS        Interpretation Summary        · Low risk for ischemic heart disease.  · Findings consistent with an abnormal ECG stress test.  · Patient has claustrophobia, which caused difficulty with imaging..  · Left ventricular ejection fraction is normal (Calculated EF = 60%).  · Myocardial perfusion imaging indicates a normal myocardial perfusion study with no evidence of ischemia.  · Impressions are consistent with a low risk study.        Interpretation Summary     · Saline bubble test neg for PFO  · Calculated EF = 62%  · There is no evidence of pericardial effusion.              Current Outpatient Medications:   •  amLODIPine (NORVASC) 5 MG tablet, Take 1 tablet by mouth Daily., Disp: 30 tablet, Rfl: 11     Assessment:        Patient Active Problem List   Diagnosis   • Nephrolithiasis   • Exudative pharyngitis   • WOJCIECH (acute kidney injury) (CMS/HCC)   • Hydronephrosis   • Leukocytosis   • Tobacco abuse   • Abnormal EKG   • Borderline systolic HTN   • Overweight   • Skipped heart beats               Plan:    1. Borderline systolic hypertension: Previous stress test and echo normal. I discussed his monitor study.He was started on Norvasc during his last visit due to increased blood pressure. He states his blood pressure has been better controlled. I will increase his norvasc to 10 mg a day and he will take his blood pressure 1 hour and 30 minutes after taking his blood pressure. We discussed low sodium/heart healthy diet.    Educated patient on exercising for at least 30 minutes a day for 2 to 3 days a week. Importance of controlling hypertension and blood pressure checkup on the regular basis has been explained. Hypertension as a silent killer has been discussed. Risk reduction of the weight and regular exercises to control the hypertension has been explained.    2. Tobacco abuse: 3 minutes spent in counseling    Walker Aldana has been explained that tobacco  abuse is extremely harmful to the body including to the cardiovascular, it significantly increases the risk of atherosclerosis, myocardial infarction, strokes and peripheral vascular disease  Strongly advised to stop tobacco abuse. Secondhand smoking also has been explained         No diagnosis found.    There are no diagnoses linked to this encounter.    COUNSELING:    Walker Woody was given to patient for the following topics: diagnostic results, risk factor reductions, impressions, risks and benefits of treatment options and importance of treatment compliance .       SMOKING COUNSELING:  He states he smokes about 1 or less packs of cigarettes a day. He states he wants to stop. Spent 3 min. In counseling    I will Increase Norvasc to 10 mg a day, and I will see in 2 weeks.      Sincerely,     RUIZ Marie  [facility]  04/20/20   [id number]    This patient has consented to a telehealth visit via video. The visit was scheduled as a video visit to comply with patient safety concerns in accordance with CDC recommendations.  All vitals recorded within this visit are reported by the patient.  I spent 25 minutes in total including but not limited to the 13 minutes spent in direct conversation with this patient.

## 2020-05-04 ENCOUNTER — TELEMEDICINE (OUTPATIENT)
Dept: CARDIOLOGY | Facility: CLINIC | Age: 55
End: 2020-05-04

## 2020-05-04 VITALS — BODY MASS INDEX: 31.29 KG/M2 | HEIGHT: 77 IN | WEIGHT: 265 LBS

## 2020-05-04 DIAGNOSIS — R03.0 BORDERLINE SYSTOLIC HTN: Primary | ICD-10-CM

## 2020-05-04 DIAGNOSIS — Z72.0 TOBACCO USE: ICD-10-CM

## 2020-05-04 PROCEDURE — 99213 OFFICE O/P EST LOW 20 MIN: CPT | Performed by: NURSE PRACTITIONER

## 2020-05-04 NOTE — PROGRESS NOTES
Subjective:        Walker Aldana is a 54 y.o. male who here for follow up    No chief complaint on file.    He is via video today for a follow up for a blood pressure recheck.     HPI     Walker Aldana is a 54 year old male, who is current with this provider. He has a history of hypertension, arthritis, asthma, kidney stones, and diabetes. He is being seen today to for a blood pressure recheck after increasing norvasc. His previous echo revealed EF 62%,and saline bubble test negative for PFO. His stress test indicated a normal myocardial perfusion study with no evidence of ischemia. His monitored showed NSR with frequent PACs, occasional PVC's and frequent non sustained SVT's.    He denies chest pain, shortness of breath, and palpitations.      The following portions of the patient's history were reviewed and updated as appropriate: allergies, current medications, past family history, past medical history, past social history, past surgical history and problem list.    Past Medical History:   Diagnosis Date   • Arthritis    • Asthma    • Kidney stones     has a stent         reports that he has been smoking. He has been smoking about 1.00 pack per day. He has never used smokeless tobacco. He reports that he drinks alcohol. He reports that he does not use drugs.     Family History   Problem Relation Age of Onset   • Heart attack Father    • Heart disease Father    • Hypertension Father    • Heart disease Maternal Grandfather    • Arrhythmia Maternal Grandfather    • Hypertension Mother    • Diabetes Mother        ROS     Review of Systems No change  Constitutional: No wt loss, fever, fatigue  Gastrointestinal: No nausea, abdominal pain  Behavioral/Psych: No insomnia or anxiety  Cardiovascular: Denies chest pain, shortness of breath, and syncope      Objective:        This is a video visit and unable to do PE.  Physical Exam   Psychiatric: He has a normal mood and affect. His behavior is normal.       Procedures    Interpretation Summary     · An abnormal monitor study.  · NSR WITH FREQUENT PACS, OCCASIONAL PVCS, AND FREQUENT NON SUSTAINED SVTS        Interpretation Summary        · Low risk for ischemic heart disease.  · Findings consistent with an abnormal ECG stress test.  · Patient has claustrophobia, which caused difficulty with imaging..  · Left ventricular ejection fraction is normal (Calculated EF = 60%).  · Myocardial perfusion imaging indicates a normal myocardial perfusion study with no evidence of ischemia.  · Impressions are consistent with a low risk study.        Interpretation Summary     · Saline bubble test neg for PFO  · Calculated EF = 62%  · There is no evidence of pericardial effusion.              Current Outpatient Medications:   •  amLODIPine (NORVASC) 10 MG tablet, Take 1 tablet by mouth Daily., Disp: 30 tablet, Rfl: 5     Assessment:        Patient Active Problem List   Diagnosis   • Nephrolithiasis   • Exudative pharyngitis   • WOJCIECH (acute kidney injury) (CMS/HCC)   • Hydronephrosis   • Leukocytosis   • Tobacco abuse   • Abnormal EKG   • Borderline systolic HTN   • Overweight   • Skipped heart beats               Plan:    1. Hypertension: He is here today for a blood pressure recheck.  On his last viist his Norvasc was increased.  He states his blood pressure has been controlled.     Educated patient on exercising for at least 30 minutes a day for 2 to 3 days a week. Importance of controlling hypertension and blood pressure checkup on the regular basis has been explained. Hypertension as a silent killer has been discussed. Risk reduction of the weight and regular exercises to control the hypertension has been explained.     2. Tobacco abuse: 1 minutes spent on counseling.     Walker Aldana has been explained that tobacco abuse is extremely harmful to the body including to the cardiovascular, it significantly increases the risk of atherosclerosis, myocardial infarction, strokes and peripheral  vascular disease  Strongly advised to stop tobacco abuse  Secondhand smoking also has been explained         No diagnosis found.    There are no diagnoses linked to this encounter.    COUNSELING:    Walker Woody was given to patient for the following topics: diagnostic results, risk factor reductions, impressions, risks and benefits of treatment options and importance of treatment compliance .       SMOKING COUNSELING:  He states he smokes about 1 or less packs of cigarettes a day. He states he wants to stop. Spent 1 min. on counseling    He will follow up in 6 months, unless he needs to be seen sooner.     Sincerely,     RUIZ Marie  [facility]  05/04/20   [id number]     This patient has consented to a telehealth visit via video. The visit was scheduled as a video visit to comply with patient safety concerns in accordance with CDC recommendations.  All vitals recorded within this visit are reported by the patient.  I spent  15 minutes in total including but not limited to the 7 minutes spent in direct conversation with this patient.     The use of a video visit has been reviewed with the patient and verbal informed consent has been obtained.

## 2020-07-24 ENCOUNTER — HOSPITAL ENCOUNTER (OUTPATIENT)
Dept: CARDIOLOGY | Facility: HOSPITAL | Age: 55
Discharge: HOME | End: 2020-07-24
Admitting: ORTHOPAEDIC SURGERY

## 2020-07-24 ENCOUNTER — HOSPITAL ENCOUNTER (OUTPATIENT)
Dept: GENERAL RADIOLOGY | Facility: HOSPITAL | Age: 55
Discharge: HOME | End: 2020-07-24

## 2020-07-24 ENCOUNTER — TRANSCRIBE ORDERS (OUTPATIENT)
Dept: CARDIOLOGY | Facility: HOSPITAL | Age: 55
End: 2020-07-24

## 2020-07-24 ENCOUNTER — TRANSCRIBE ORDERS (OUTPATIENT)
Dept: ADMINISTRATIVE | Facility: HOSPITAL | Age: 55
End: 2020-07-24

## 2020-07-24 ENCOUNTER — LAB (OUTPATIENT)
Dept: LAB | Facility: HOSPITAL | Age: 55
End: 2020-07-24

## 2020-07-24 DIAGNOSIS — Z01.811 PRE-OP CHEST EXAM: Primary | ICD-10-CM

## 2020-07-24 DIAGNOSIS — Z01.811 PRE-OP CHEST EXAM: ICD-10-CM

## 2020-07-24 DIAGNOSIS — M25.50 ARTHRALGIA, UNSPECIFIED JOINT: ICD-10-CM

## 2020-07-24 DIAGNOSIS — M25.50 ARTHRALGIA, UNSPECIFIED JOINT: Primary | ICD-10-CM

## 2020-07-24 LAB
ALBUMIN SERPL-MCNC: 4.2 G/DL (ref 3.5–5.2)
ALBUMIN/GLOB SERPL: 1.4 G/DL
ALP SERPL-CCNC: 64 U/L (ref 39–117)
ALT SERPL W P-5'-P-CCNC: 11 U/L (ref 1–41)
ANION GAP SERPL CALCULATED.3IONS-SCNC: 8.5 MMOL/L (ref 5–15)
AST SERPL-CCNC: 11 U/L (ref 1–40)
BACTERIA UR QL AUTO: ABNORMAL /HPF
BASOPHILS # BLD AUTO: 0.03 10*3/MM3 (ref 0–0.2)
BASOPHILS NFR BLD AUTO: 0.3 % (ref 0–1.5)
BILIRUB SERPL-MCNC: 0.6 MG/DL (ref 0–1.2)
BILIRUB UR QL STRIP: NEGATIVE
BUN SERPL-MCNC: 13 MG/DL (ref 6–20)
BUN/CREAT SERPL: 16 (ref 7–25)
CALCIUM SPEC-SCNC: 9.3 MG/DL (ref 8.6–10.5)
CHLORIDE SERPL-SCNC: 104 MMOL/L (ref 98–107)
CLARITY UR: CLEAR
CO2 SERPL-SCNC: 26.5 MMOL/L (ref 22–29)
COLOR UR: YELLOW
CREAT SERPL-MCNC: 0.81 MG/DL (ref 0.76–1.27)
DEPRECATED RDW RBC AUTO: 41.3 FL (ref 37–54)
EOSINOPHIL # BLD AUTO: 0.06 10*3/MM3 (ref 0–0.4)
EOSINOPHIL NFR BLD AUTO: 0.6 % (ref 0.3–6.2)
ERYTHROCYTE [DISTWIDTH] IN BLOOD BY AUTOMATED COUNT: 13.6 % (ref 12.3–15.4)
GFR SERPL CREATININE-BSD FRML MDRD: 121 ML/MIN/1.73
GLOBULIN UR ELPH-MCNC: 2.9 GM/DL
GLUCOSE SERPL-MCNC: 93 MG/DL (ref 65–99)
GLUCOSE UR STRIP-MCNC: NEGATIVE MG/DL
HCT VFR BLD AUTO: 41.5 % (ref 37.5–51)
HGB BLD-MCNC: 13.7 G/DL (ref 13–17.7)
HGB UR QL STRIP.AUTO: ABNORMAL
HYALINE CASTS UR QL AUTO: ABNORMAL /LPF
IMM GRANULOCYTES # BLD AUTO: 0.04 10*3/MM3 (ref 0–0.05)
IMM GRANULOCYTES NFR BLD AUTO: 0.4 % (ref 0–0.5)
INR PPP: 1.03 (ref 0.9–1.1)
KETONES UR QL STRIP: ABNORMAL
LEUKOCYTE ESTERASE UR QL STRIP.AUTO: ABNORMAL
LYMPHOCYTES # BLD AUTO: 2.06 10*3/MM3 (ref 0.7–3.1)
LYMPHOCYTES NFR BLD AUTO: 19.5 % (ref 19.6–45.3)
MCH RBC QN AUTO: 27.4 PG (ref 26.6–33)
MCHC RBC AUTO-ENTMCNC: 33 G/DL (ref 31.5–35.7)
MCV RBC AUTO: 83 FL (ref 79–97)
MONOCYTES # BLD AUTO: 0.38 10*3/MM3 (ref 0.1–0.9)
MONOCYTES NFR BLD AUTO: 3.6 % (ref 5–12)
NEUTROPHILS NFR BLD AUTO: 7.97 10*3/MM3 (ref 1.7–7)
NEUTROPHILS NFR BLD AUTO: 75.6 % (ref 42.7–76)
NITRITE UR QL STRIP: NEGATIVE
NRBC BLD AUTO-RTO: 0 /100 WBC (ref 0–0.2)
PH UR STRIP.AUTO: <=5 [PH] (ref 5–8)
PLATELET # BLD AUTO: 262 10*3/MM3 (ref 140–450)
PMV BLD AUTO: 9.1 FL (ref 6–12)
POTASSIUM SERPL-SCNC: 4.1 MMOL/L (ref 3.5–5.2)
PROT SERPL-MCNC: 7.1 G/DL (ref 6–8.5)
PROT UR QL STRIP: NEGATIVE
PROTHROMBIN TIME: 13.4 SECONDS (ref 11.7–14.2)
RBC # BLD AUTO: 5 10*6/MM3 (ref 4.14–5.8)
RBC # UR: ABNORMAL /HPF
REF LAB TEST METHOD: ABNORMAL
SODIUM SERPL-SCNC: 139 MMOL/L (ref 136–145)
SP GR UR STRIP: 1.03 (ref 1–1.03)
SQUAMOUS #/AREA URNS HPF: ABNORMAL /HPF
UROBILINOGEN UR QL STRIP: ABNORMAL
WBC # BLD AUTO: 10.54 10*3/MM3 (ref 3.4–10.8)
WBC UR QL AUTO: ABNORMAL /HPF

## 2020-07-24 PROCEDURE — 80053 COMPREHEN METABOLIC PANEL: CPT

## 2020-07-24 PROCEDURE — 85610 PROTHROMBIN TIME: CPT

## 2020-07-24 PROCEDURE — 85025 COMPLETE CBC W/AUTO DIFF WBC: CPT

## 2020-07-24 PROCEDURE — 93010 ELECTROCARDIOGRAM REPORT: CPT | Performed by: INTERNAL MEDICINE

## 2020-07-24 PROCEDURE — 36415 COLL VENOUS BLD VENIPUNCTURE: CPT

## 2020-07-24 PROCEDURE — 81001 URINALYSIS AUTO W/SCOPE: CPT

## 2020-07-24 PROCEDURE — 71046 X-RAY EXAM CHEST 2 VIEWS: CPT

## 2020-07-24 PROCEDURE — 93005 ELECTROCARDIOGRAM TRACING: CPT

## 2020-10-09 NOTE — PROGRESS NOTES
"6MO    Subjective:        Walker Aldana is a 54 y.o. male who here for follow up    CC  HTN follow-up  HPI  54-year-old male with known history of the abnormal EKG as well as benign essential arterial hypertension here for the follow-up after the patient has been started on amlodipine    Blood pressure is markedly improved with no side effects     Problem List Items Addressed This Visit        Cardiovascular and Mediastinum    Abnormal EKG - Primary    Relevant Orders    Lipid Panel    Comprehensive Metabolic Panel    Borderline systolic HTN        .    The following portions of the patient's history were reviewed and updated as appropriate: allergies, current medications, past family history, past medical history, past social history, past surgical history and problem list.    Past Medical History:   Diagnosis Date   • Arthritis    • Asthma    • Kidney stones     has a stent     reports that he has been smoking. He has been smoking about 1.00 pack per day. He has never used smokeless tobacco. He reports current alcohol use. He reports that he does not use drugs.   Family History   Problem Relation Age of Onset   • Heart attack Father    • Heart disease Father    • Hypertension Father    • Heart disease Maternal Grandfather    • Arrhythmia Maternal Grandfather    • Hypertension Mother    • Diabetes Mother        Review of Systems  Constitutional: No wt loss, fever, fatigue  Gastrointestinal: No nausea, abdominal pain  Behavioral/Psych: No insomnia or anxiety   Cardiovascular no chest pains or tightness in the chest  Objective:       Physical Exam    /82 (BP Location: Left arm, Patient Position: Sitting)   Pulse 77   Ht 194.9 cm (76.75\")   Wt 111 kg (244 lb)   BMI 29.12 kg/m²   General appearance: No acute changes   Neck: Trachea midline; NECK, supple, no thyromegaly or lymphadenopathy   Lungs: Normal size and shape, normal breath sounds, equal distribution of air, no rales and rhonchi   CV: S1-S2 " regular, no murmurs, no rub, no gallop   Abdomen: Soft, non-tender; no masses , no abnormal abdominal sounds   Extremities: No deformity , normal color , no peripheral edema   Skin: Normal temperature, turgor and texture; no rash, ulcers          Procedures      Echocardiogram:        Current Outpatient Medications:   •  amLODIPine (NORVASC) 10 MG tablet, Take 1 tablet by mouth Daily., Disp: 30 tablet, Rfl: 5   Assessment:        Patient Active Problem List   Diagnosis   • Nephrolithiasis   • Exudative pharyngitis   • WOJCIECH (acute kidney injury) (CMS/HCC)   • Hydronephrosis   • Leukocytosis   • Tobacco abuse   • Abnormal EKG   • Borderline systolic HTN   • Overweight   • Skipped heart beats               Plan:            ICD-10-CM ICD-9-CM   1. Abnormal EKG  R94.31 794.31   2. Borderline systolic HTN  R03.0 796.2     1. Abnormal EKG  No chest pain  - Lipid Panel  - Comprehensive Metabolic Panel    2. Borderline systolic HTN  Blood pressure better with Norvasc       BP BETTER  WITH NORVASC    6 MONTHS WITH LABS  COUNSELING:    Walker Woody was given to patient for the following topics: diagnostic results, risk factor reductions, impressions, risks and benefits of treatment options and importance of treatment compliance .       SMOKING COUNSELING:    [unfilled]    Dictated using Dragon dictation

## 2020-10-12 ENCOUNTER — OFFICE VISIT (OUTPATIENT)
Dept: CARDIOLOGY | Facility: CLINIC | Age: 55
End: 2020-10-12

## 2020-10-12 VITALS
DIASTOLIC BLOOD PRESSURE: 82 MMHG | HEART RATE: 77 BPM | WEIGHT: 244 LBS | HEIGHT: 77 IN | SYSTOLIC BLOOD PRESSURE: 155 MMHG | BODY MASS INDEX: 28.81 KG/M2

## 2020-10-12 DIAGNOSIS — R03.0 BORDERLINE SYSTOLIC HTN: ICD-10-CM

## 2020-10-12 DIAGNOSIS — R94.31 ABNORMAL EKG: Primary | ICD-10-CM

## 2020-10-12 PROCEDURE — 99213 OFFICE O/P EST LOW 20 MIN: CPT | Performed by: INTERNAL MEDICINE

## 2020-11-12 RX ORDER — AMLODIPINE BESYLATE 10 MG/1
TABLET ORAL
Qty: 30 TABLET | Refills: 5 | Status: SHIPPED | OUTPATIENT
Start: 2020-11-12 | End: 2021-06-02

## 2021-04-12 ENCOUNTER — OFFICE VISIT (OUTPATIENT)
Dept: CARDIOLOGY | Facility: CLINIC | Age: 56
End: 2021-04-12

## 2021-04-12 VITALS
DIASTOLIC BLOOD PRESSURE: 85 MMHG | HEART RATE: 80 BPM | BODY MASS INDEX: 29.83 KG/M2 | WEIGHT: 245 LBS | HEIGHT: 76 IN | SYSTOLIC BLOOD PRESSURE: 139 MMHG

## 2021-04-12 DIAGNOSIS — R03.0 BORDERLINE SYSTOLIC HTN: Primary | ICD-10-CM

## 2021-04-12 DIAGNOSIS — E66.3 OVERWEIGHT: ICD-10-CM

## 2021-04-12 DIAGNOSIS — Z72.0 TOBACCO ABUSE: ICD-10-CM

## 2021-04-12 DIAGNOSIS — R94.31 ABNORMAL EKG: ICD-10-CM

## 2021-04-12 PROCEDURE — 99213 OFFICE O/P EST LOW 20 MIN: CPT | Performed by: INTERNAL MEDICINE

## 2021-04-12 NOTE — PROGRESS NOTES
"6 MO FOLLOW UP   Subjective:        Walker Aldana is a 55 y.o. male who here for follow up    CC  BP follow-up  HPI  55-year-old male with a benign essential arterial hypertension, overweight and abnormal EKG here for the follow-up with no complaints of chest pains tightness heaviness or the pressure sensation     Problems Addressed this Visit        Cardiac and Vasculature    Abnormal EKG    Borderline systolic HTN - Primary       Endocrine and Metabolic    Overweight       Tobacco    Tobacco abuse      Diagnoses       Codes Comments    Borderline systolic HTN    -  Primary ICD-10-CM: R03.0  ICD-9-CM: 796.2     Abnormal EKG     ICD-10-CM: R94.31  ICD-9-CM: 794.31     Tobacco abuse     ICD-10-CM: Z72.0  ICD-9-CM: 305.1     Overweight     ICD-10-CM: E66.3  ICD-9-CM: 278.02         .    The following portions of the patient's history were reviewed and updated as appropriate: allergies, current medications, past family history, past medical history, past social history, past surgical history and problem list.    Past Medical History:   Diagnosis Date   • Arthritis    • Asthma    • Kidney stones     has a stent     reports that he has been smoking. He has been smoking about 1.00 pack per day. He has never used smokeless tobacco. He reports current alcohol use. He reports that he does not use drugs.   Family History   Problem Relation Age of Onset   • Heart attack Father    • Heart disease Father    • Hypertension Father    • Heart disease Maternal Grandfather    • Arrhythmia Maternal Grandfather    • Hypertension Mother    • Diabetes Mother        Review of Systems  Constitutional: No wt loss, fever, fatigue  Gastrointestinal: No nausea, abdominal pain  Behavioral/Psych: No insomnia or anxiety   Cardiovascular no chest pains or tightness in the chest  Objective:       Physical Exam  /85   Pulse 80   Ht 193 cm (76\")   Wt 111 kg (245 lb)   BMI 29.82 kg/m²   General appearance: No acute changes   Neck: Trachea " midline; NECK, supple, no thyromegaly or lymphadenopathy   Lungs: Normal size and shape, normal breath sounds, equal distribution of air, no rales and rhonchi   CV: S1-S2 regular, no murmurs, no rub, no gallop   Abdomen: Soft, non-tender; no masses , no abnormal abdominal sounds   Extremities: No deformity , normal color , no peripheral edema   Skin: Normal temperature, turgor and texture; no rash, ulcers          Procedures      Echocardiogram:        Current Outpatient Medications:   •  amLODIPine (NORVASC) 10 MG tablet, TAKE ONE TABLET BY MOUTH DAILY, Disp: 30 tablet, Rfl: 5   Assessment:        Patient Active Problem List   Diagnosis   • Nephrolithiasis   • Exudative pharyngitis   • WOJCIECH (acute kidney injury) (CMS/HCC)   • Hydronephrosis   • Leukocytosis   • Tobacco abuse   • Abnormal EKG   • Borderline systolic HTN   • Overweight   • Skipped heart beats               Plan:            ICD-10-CM ICD-9-CM   1. Borderline systolic HTN  R03.0 796.2   2. Abnormal EKG  R94.31 794.31   3. Tobacco abuse  Z72.0 305.1   4. Overweight  E66.3 278.02     1. Borderline systolic HTN  Under control    2. Abnormal EKG  No significant change    3. Tobacco abuse  Continue current treatment    4. Overweight  Counseling done       SEE IN 1 YR   COUNSELING:    Walker Woody was given to patient for the following topics: diagnostic results, risk factor reductions, impressions, risks and benefits of treatment options and importance of treatment compliance .       SMOKING COUNSELING:    [unfilled]    Dictated using Dragon dictation

## 2021-06-04 RX ORDER — AMLODIPINE BESYLATE 10 MG/1
TABLET ORAL
Qty: 30 TABLET | Refills: 3 | Status: SHIPPED | OUTPATIENT
Start: 2021-06-04 | End: 2021-10-11

## 2021-10-11 RX ORDER — AMLODIPINE BESYLATE 10 MG/1
TABLET ORAL
Qty: 30 TABLET | Refills: 3 | Status: SHIPPED | OUTPATIENT
Start: 2021-10-11 | End: 2022-02-15

## 2022-02-15 RX ORDER — AMLODIPINE BESYLATE 10 MG/1
TABLET ORAL
Qty: 30 TABLET | Refills: 3 | Status: SHIPPED | OUTPATIENT
Start: 2022-02-15

## 2022-04-18 ENCOUNTER — OFFICE VISIT (OUTPATIENT)
Dept: CARDIOLOGY | Facility: CLINIC | Age: 57
End: 2022-04-18

## 2022-04-18 VITALS
HEART RATE: 79 BPM | WEIGHT: 254 LBS | HEIGHT: 76 IN | BODY MASS INDEX: 30.93 KG/M2 | DIASTOLIC BLOOD PRESSURE: 77 MMHG | SYSTOLIC BLOOD PRESSURE: 164 MMHG

## 2022-04-18 DIAGNOSIS — R03.0 BORDERLINE SYSTOLIC HTN: Primary | ICD-10-CM

## 2022-04-18 DIAGNOSIS — R94.31 ABNORMAL EKG: ICD-10-CM

## 2022-04-18 DIAGNOSIS — I45.9 SKIPPED HEART BEATS: ICD-10-CM

## 2022-04-18 PROCEDURE — 99213 OFFICE O/P EST LOW 20 MIN: CPT | Performed by: INTERNAL MEDICINE

## 2022-04-18 PROCEDURE — 93000 ELECTROCARDIOGRAM COMPLETE: CPT | Performed by: INTERNAL MEDICINE

## 2022-04-18 NOTE — PROGRESS NOTES
"1 YR FOLLOW UP    Subjective:        Walker Aldana is a 56 y.o. male who here for follow up    CC  Follow up htn, skipped beats  HPI  56 yr no sobold w/m here for follow up with abnormal ekg, htn, skipped beats, no cp,      Problems Addressed this Visit        Cardiac and Vasculature    Abnormal EKG    Borderline systolic HTN - Primary    Skipped heart beats      Diagnoses       Codes Comments    Borderline systolic HTN    -  Primary ICD-10-CM: R03.0  ICD-9-CM: 796.2     Abnormal EKG     ICD-10-CM: R94.31  ICD-9-CM: 794.31     Skipped heart beats     ICD-10-CM: I45.9  ICD-9-CM: 427.9         .    The following portions of the patient's history were reviewed and updated as appropriate: allergies, current medications, past family history, past medical history, past social history, past surgical history and problem list.    Past Medical History:   Diagnosis Date   • Arthritis    • Asthma    • Kidney stones     has a stent     reports that he has been smoking. He has been smoking about 1.00 pack per day. He has never used smokeless tobacco. He reports current alcohol use. He reports that he does not use drugs.   Family History   Problem Relation Age of Onset   • Heart attack Father    • Heart disease Father    • Hypertension Father    • Heart disease Maternal Grandfather    • Arrhythmia Maternal Grandfather    • Hypertension Mother    • Diabetes Mother        Review of Systems  Constitutional: No wt loss, fever, fatigue  Gastrointestinal: No nausea, abdominal pain  Behavioral/Psych: No insomnia or anxiety   Cardiovascular no cp  Objective:       Physical Exam  /77   Pulse 79   Ht 193 cm (76\")   Wt 115 kg (254 lb)   BMI 30.92 kg/m²   General appearance: No acute changes   Neck: Trachea midline; NECK, supple, no thyromegaly or lymphadenopathy   Lungs: Normal size and shape, normal breath sounds, equal distribution of air, no rales and rhonchi   CV: S1-S2 regular, no murmurs, no rub, no gallop   Abdomen: Soft, " nontender; no masses , no abnormal abdominal sounds   Extremities: No deformity , normal color , no peripheral edema   Skin: Normal temperature, turgor and texture; no rash, ulcers            ECG 12 Lead    Date/Time: 4/18/2022 1:54 PM  Performed by: Claudia Velez MD  Authorized by: Claudia Velez MD   Comparison: compared with previous ECG   Similar to previous ECG  Rhythm: sinus rhythm  ST Flattening: all    Clinical impression: non-specific ECG              Echocardiogram:        Current Outpatient Medications:   •  amLODIPine (NORVASC) 10 MG tablet, TAKE ONE TABLET BY MOUTH DAILY, Disp: 30 tablet, Rfl: 3   Assessment:        Patient Active Problem List   Diagnosis   • Nephrolithiasis   • Exudative pharyngitis   • WOJCIECH (acute kidney injury) (HCC)   • Hydronephrosis   • Leukocytosis   • Tobacco abuse   • Abnormal EKG   • Borderline systolic HTN   • Overweight   • Skipped heart beats               Plan:            ICD-10-CM ICD-9-CM   1. Borderline systolic HTN  R03.0 796.2   2. Abnormal EKG  R94.31 794.31   3. Skipped heart beats  I45.9 427.9     1. Borderline systolic HTN  bp better at home    2. Abnormal EKG  No cp    3. Skipped heart beats  No angina       BP BETTER AT HOME    SEE IN 1 YR  COUNSELING:    Walker Woody was given to patient for the following topics: diagnostic results, risk factor reductions, impressions, risks and benefits of treatment options and importance of treatment compliance .       SMOKING COUNSELING:    [unfilled]    Dictated using Dragon dictation

## 2023-05-16 ENCOUNTER — OFFICE VISIT (OUTPATIENT)
Dept: CARDIOLOGY | Facility: CLINIC | Age: 58
End: 2023-05-16
Payer: COMMERCIAL

## 2023-05-16 VITALS
HEART RATE: 73 BPM | BODY MASS INDEX: 30.32 KG/M2 | DIASTOLIC BLOOD PRESSURE: 74 MMHG | HEIGHT: 76 IN | WEIGHT: 249 LBS | SYSTOLIC BLOOD PRESSURE: 122 MMHG

## 2023-05-16 DIAGNOSIS — I10 HYPERTENSION, UNSPECIFIED TYPE: Primary | ICD-10-CM

## 2023-05-16 PROCEDURE — 93000 ELECTROCARDIOGRAM COMPLETE: CPT

## 2023-05-16 PROCEDURE — 99212 OFFICE O/P EST SF 10 MIN: CPT

## 2023-05-16 NOTE — PROGRESS NOTES
Emergency Department    [address] [facility] [address]    Phone:  [phone]    Fax:  [phone]                                       Johny Bejarano   MRN: [id number]    Department:   Emergency Department   Date of Visit:  5/14/2017           Patient Information     Date Of Birth          1987        Your diagnoses for this visit were:     Pain of finger of right hand     Finger laceration, initial encounter        You were seen by Leigh Ann Bryant PA-C.      Follow-up Information     Follow up with  Emergency Department In 1 week.    Specialty:  EMERGENCY MEDICINE    Why:  For suture removal    Contact information:    [address] [facility] [address] [phone]        Discharge Instructions       Discharge Instructions  Laceration (Cut)    You were seen today for a laceration (cut).  Your doctor examined your laceration for any problems such a buried foreign body (like glass, a splinter, or gravel), or injury to blood vessels, tendons, and nerves.  Your doctor may have also rinsed and/or scrubbed your laceration to help prevent an infection.  Your laceration may have been closed with glue, staples or sutures (stitches).      It may not be possible to find all problems with your laceration on the first visit, and we can't always prevent infections.  Antibiotics are only given when the benefit is more than the risk, and don't prevent all infections. Some lacerations are too high risk to close, and are left open to heal.  All lacerations, no matter how expertly repaired, will cause scarring.    Return to the Emergency Department right away if:    You have more redness, swelling, pain, drainage (pus), a bad smell, or red streaking from your laceration.      You have a fever of 101oF or more.    You have bleeding that you can t stop at home. If your cut starts to bleed, hold pressure on the bleeding area with a clean cloth or put pressure over the   Subjective:        Walker Aldana is a 57 y.o. male who here for follow up    No chief complaint on file.      HPI    Walker Aldana is a 57 year old male with hypertension. He follows up in office today for management of hypertension. Echo 2020 normal ef and LV function, negative bubble study. Stress test 2020 was a normal myocardial perfusion study with no evidence of ischemia.     The following portions of the patient's history were reviewed and updated as appropriate: allergies, current medications, past family history, past medical history, past social history, past surgical history and problem list.    Past Medical History:   Diagnosis Date   • Arthritis    • Asthma    • Kidney stones     has a stent         reports that he has been smoking cigarettes. He has been smoking an average of 1 pack per day. He has never used smokeless tobacco. He reports current alcohol use. He reports that he does not use drugs.     Family History   Problem Relation Age of Onset   • Heart attack Father    • Heart disease Father    • Hypertension Father    • Heart disease Maternal Grandfather    • Arrhythmia Maternal Grandfather    • Hypertension Mother    • Diabetes Mother        ROS     Review of Systems  Constitutional: No wt loss, fever, fatigue  Gastrointestinal: No nausea, abdominal pain  Behavioral/Psych: No insomnia or anxiety  Cardiovascular no chest pain or shortness of breath      Objective:           Vitals and nursing note reviewed.   Constitutional:       Appearance: Well-developed.   HENT:      Head: Normocephalic.      Right Ear: External ear normal.      Left Ear: External ear normal.   Neck:      Vascular: No JVD.   Pulmonary:      Effort: Pulmonary effort is normal. No respiratory distress.      Breath sounds: Normal breath sounds. No stridor. No rales.   Cardiovascular:      Normal rate. Regular rhythm.      No gallop.   Pulses:     Intact distal pulses.   Abdominal:      General: Bowel sounds are normal. There  bandage.  If the bleeding doesn t stop after using constant pressure for 30 minutes, you should return to the Emergency Department for further treatment.    An area past the laceration is cool, pale, or blue compared with the other side, or has a slower return of color when squeezed.    Your dressing seems too tight or starts to get uncomfortable or painful.    You have loss of normal function or use of an area, such as being unable to straighten or bend a finger normally.    You have a numb area past the laceration.    Return to the Emergency Department or see your regular doctor if:    The laceration starts to come open.     You have something coming out of the cut or a feeling that there is something in the laceration.    Your wound will not heal, or keeps breaking open. There can always be glass, wood, dirt or other things in any wound.  They won t always show up, even on x-rays.  If a wound doesn t heal, this may be why, and it is important to follow-up with your regular doctor.    Home Care:    Take your dressing off in 12 hours, or as instructed by your doctor, to check your laceration. Remove the dressing sooner if it seems too tight or painful, or if it is getting numb, tingly, or pale past the dressing.    Gently wash your laceration 2 times a day with clean cloth and soap.     It is okay to shower, but do not let the laceration soak in water.      If your laceration was closed with wound adhesive or strips: pat it dry and leave it open to the air.     For all other repairs: after you wash your laceration, or at least 2 times a day, apply bacitracin or other antibiotic ointment to the laceration, then cover it with a Band-Aid  or gauze.    Keep the laceration clean. Wear gloves or other protective clothing if you are around dirt.    Follow-up:    You need to follow-up with your regular doctor in 2-3 days for a wound check if you have any concern for healing.    Your sutures or staples need to be removed in  is no distension.      Palpations: Abdomen is soft.      Tenderness: There is no abdominal tenderness. There is no guarding.   Musculoskeletal: Normal range of motion.         General: No tenderness.      Cervical back: Normal range of motion. Skin:     General: Skin is warm.   Neurological:      Mental Status: Alert and oriented to person, place, and time.      Deep Tendon Reflexes: Reflexes are normal and symmetric.   Psychiatric:         Judgment: Judgment normal.           ECG 12 Lead    Date/Time: 5/16/2023 3:36 PM  Performed by: Carito Mendes APRN  Authorized by: Carito Mendes APRN   Comparison: compared with previous ECG from 4/18/2022  Similar to previous ECG  Rhythm: sinus rhythm  Rate: normal  BPM: 73    Clinical impression: non-specific ECG          Interpretation Summary       • Low risk for ischemic heart disease.  • Findings consistent with an abnormal ECG stress test.  • Patient has claustrophobia, which caused difficulty with imaging..  • Left ventricular ejection fraction is normal (Calculated EF = 60%).  • Myocardial perfusion imaging indicates a normal myocardial perfusion study with no evidence of ischemia.  • Impressions are consistent with a low risk study.  Interpretation Summary    • Saline bubble test neg for PFO  • Calculated EF = 62%  • There is no evidence of pericardial effusion.           Current Outpatient Medications:   •  amLODIPine (NORVASC) 10 MG tablet, TAKE ONE TABLET BY MOUTH DAILY, Disp: 30 tablet, Rfl: 3     Assessment:        Patient Active Problem List   Diagnosis   • Nephrolithiasis   • Exudative pharyngitis   • WOJCIECH (acute kidney injury)   • Hydronephrosis   • Leukocytosis   • Tobacco abuse   • Abnormal EKG   • Borderline systolic HTN   • Overweight   • Skipped heart beats               Plan:   1. Hypertension: 122/74, bp controlled on amlodipine    Importance of controlling hypertension and blood pressure  checkup on the regular basis has been explained.  "7 days. Schedule an appointment with your regular doctor to have this done.    Scars:  To help minimize scarring:    Wear sunscreen over the healed laceration when out in the sun.    Massage the area regularly.    You may use Vitamin E oil.    Wait a year.  Most scars will start to fade within a year.    Probiotics: If you have been given an antibiotic, you may want to also take a probiotic pill or eat yogurt with live cultures. Probiotics have \"good bacteria\" to help your intestines stay healthy. Studies have shown that probiotics help prevent diarrhea and other intestine problems (including C. diff infection) when you take antibiotics. You can buy these without a prescription in the pharmacy section of the store.     If you were given a prescription for medicine here today, be sure to read all of the information (including the package insert) that comes with your prescription.  This will include important information about the medicine, its side effects, and any warnings that you need to know about.  The pharmacist who fills the prescription can provide more information and answer questions you may have about the medicine.  If you have questions or concerns that the pharmacist cannot address, please call or return to the Emergency Department.     Opioid Medication Information    Pain medications are among the most commonly prescribed medicines, so we are including this information for all our patients. If you did not receive pain medication or get a prescription for pain medicine, you can ignore it.     You may have been given a prescription for an opioid (narcotic) pain medicine and/or have received a pain medicine while here in the Emergency Department. These medicines can make you drowsy or impaired. You must not drive, operate dangerous equipment, or engage in any other dangerous activities while taking these medications. If you drive while taking these medications, you could be arrested for DUI, or driving " Hypertension as a silent killer has been discussed. Risk reduction of the weight and regular exercises to control the hypertension has been explained.                 No diagnosis found.    There are no diagnoses linked to this encounter.    COUNSELING: jhoan Woody was given to patient for the following topics: diagnostic results, risk factor reductions, impressions, risks and benefits of treatment options and importance of treatment compliance .       SMOKING COUNSELING: smokes 1ppd    1 year or sooner if needed    Sincerely,   RUIZ Aaron  [facility]  05/16/23  15:31 EDT    EMR Dragon/Transcription disclaimer:   Much of this encounter note is an electronic transcription/translation of spoken language to printed text. The electronic translation of spoken language may permit erroneous, or at times, nonsensical words or phrases to be inadvertently transcribed; Although I have reviewed the note for such errors, some may still exist.    under the influence. Do not drink any alcohol while you are taking these medications.     Opioid pain medications can cause addiction. If you have a history of chemical dependency of any type, you are at a higher risk of becoming addicted to pain medications.  Only take these prescribed medications to treat your pain when all other options have been tried. Take it for as short a time and as few doses as possible. Store your pain pills in a secure place, as they are frequently stolen and provide a dangerous opportunity for children or visitors in your house to start abusing these powerful medications. We will not replace any lost or stolen medicine.  As soon as your pain is better, you should flush all your remaining medication.     Many prescription pain medications contain Tylenol  (acetaminophen), including Vicodin , Tylenol #3 , Norco , Lortab , and Percocet .  You should not take any extra pills of Tylenol  if you are using these prescription medications or you can get very sick.  Do not ever take more than 3000 mg of acetaminophen in any 24 hour period.    All opioids tend to cause constipation. Drink plenty of water and eat foods that have a lot of fiber, such as fruits, vegetables, prune juice, apple juice and high fiber cereal.  Take a laxative if you don t move your bowels at least every other day. Miralax , Milk of Magnesia, Colace , or Senna  can be used to keep you regular.      Remember that you can always come back to the Emergency Department if you are not able to see your regular doctor in the amount of time listed above, if you get any new symptoms, or if there is anything that worries you.        24 Hour Appointment Hotline       To make an appointment at any [facility], call [phone] ([phone]). If you don't have a family doctor or clinic, we will help you find one. [place] clinics are conveniently located to serve the needs of you and your family.             Review of your  medicines      START taking        Dose / Directions Last dose taken    cephALEXin 500 MG capsule   Commonly known as:  KEFLEX   Dose:  500 mg   Quantity:  28 capsule        Take 1 capsule (500 mg) by mouth 4 times daily for 7 days   Refills:  0          Our records show that you are taking the medicines listed below. If these are incorrect, please call your family doctor or clinic.        Dose / Directions Last dose taken    albuterol 108 (90 BASE) MCG/ACT Inhaler   Commonly known as:  albuterol   Dose:  2 puff   Quantity:  1 Inhaler        Inhale 2 puffs into the lungs every 4 hours as needed for shortness of breath / dyspnea   Refills:  0        fluticasone 50 MCG/ACT spray   Commonly known as:  FLONASE   Dose:  1-2 spray   Quantity:  16 g        Spray 1-2 sprays into both nostrils daily   Refills:  0        ibuprofen 600 MG tablet   Commonly known as:  ADVIL/MOTRIN   Dose:  600 mg   Quantity:  20 tablet        Take 1 tablet (600 mg) by mouth every 8 hours as needed for moderate pain or fever   Refills:  0        PROPRANOLOL HCL PO        Refills:  0                Prescriptions were sent or printed at these locations (1 Prescription)                   Other Prescriptions                Printed at Department/Unit printer (1 of 1)         cephALEXin (KEFLEX) 500 MG capsule                Orders Needing Specimen Collection     None      Pending Results     No orders found from 5/12/2017 to 5/15/2017.            Pending Culture Results     No orders found from 5/12/2017 to 5/15/2017.            Pending Results Instructions     If you had any lab results that were not finalized at the time of your Discharge, you can call the ED Lab Result RN at [phone]. You will be contacted by this team for any positive Lab results or changes in treatment. The nurses are available 7 days a week from 10A to 6:30P.  You can leave a message 24 hours per day and they will return your call.        Test Results From Your Hospital  Stay               Clinical Quality Measure: Blood Pressure Screening     Your blood pressure was checked while you were in the emergency department today. The last reading we obtained was  BP: 120/88 . Please read the guidelines below about what these numbers mean and what you should do about them.  If your systolic blood pressure (the top number) is less than 120 and your diastolic blood pressure (the bottom number) is less than 80, then your blood pressure is normal. There is nothing more that you need to do about it.  If your systolic blood pressure (the top number) is 120-139 or your diastolic blood pressure (the bottom number) is 80-89, your blood pressure may be higher than it should be. You should have your blood pressure rechecked within a year by a primary care provider.  If your systolic blood pressure (the top number) is 140 or greater or your diastolic blood pressure (the bottom number) is 90 or greater, you may have high blood pressure. High blood pressure is treatable, but if left untreated over time it can put you at risk for heart attack, stroke, or kidney failure. You should have your blood pressure rechecked by a primary care provider within the next 4 weeks.  If your provider in the emergency department today gave you specific instructions to follow-up with your doctor or provider even sooner than that, you should follow that instruction and not wait for up to 4 weeks for your follow-up visit.        Thank you for choosing [facility]       Thank you for choosing [facility] for your care. Our goal is always to provide you with excellent care. Hearing back from our patients is one way we can continue to improve our services. Please take a few minutes to complete the written survey that you may receive in the mail after you visit with us. Thank you!        [employer] Information     [employer] lets you send messages to your doctor, view your test results, renew your prescriptions, schedule appointments and  "more. To sign up, go to [email or web address] . Click on \"Log in\" on the left side of the screen, which will take you to the Welcome page. Then click on \"Sign up Now\" on the right side of the page.     You will be asked to enter the access code listed below, as well as some personal information. Please follow the directions to create your username and password.     Your access code is: [email or web address]  Expires: 2017 10:48 PM     Your access code will  in 90 days. If you need help or a new code, please call your [facility] clinic or [phone].        Care EveryWhere ID     This is your Care EveryWhere ID. This could be used by other organizations to access your [facility] medical records  [id number]        After Visit Summary       This is your record. Keep this with you and show to your community pharmacist(s) and doctor(s) at your next visit.                  "

## 2023-07-24 ENCOUNTER — HOSPITAL ENCOUNTER (OUTPATIENT)
Dept: CARDIOLOGY | Facility: HOSPITAL | Age: 58
Discharge: HOME | End: 2023-07-24
Payer: COMMERCIAL

## 2023-07-24 ENCOUNTER — TRANSCRIBE ORDERS (OUTPATIENT)
Dept: ADMINISTRATIVE | Facility: HOSPITAL | Age: 58
End: 2023-07-24
Payer: COMMERCIAL

## 2023-07-24 DIAGNOSIS — R22.41 LOCALIZED SWELLING, MASS AND LUMP, LOWER LIMB, RIGHT: Primary | ICD-10-CM

## 2023-07-24 DIAGNOSIS — R22.41 LOCALIZED SWELLING, MASS AND LUMP, LOWER LIMB, RIGHT: ICD-10-CM

## 2023-07-24 LAB
BH CV LOW VAS RIGHT DISTAL FEMORAL SPONT: 1
BH CV LOW VAS RIGHT GASTRONEMIUS VESSEL: 1
BH CV LOW VAS RIGHT POPLITEAL SPONT: 1
BH CV LOWER VASCULAR LEFT COMMON FEMORAL AUGMENT: NORMAL
BH CV LOWER VASCULAR LEFT COMMON FEMORAL COMPETENT: NORMAL
BH CV LOWER VASCULAR LEFT COMMON FEMORAL COMPRESS: NORMAL
BH CV LOWER VASCULAR LEFT COMMON FEMORAL PHASIC: NORMAL
BH CV LOWER VASCULAR LEFT COMMON FEMORAL SPONT: NORMAL
BH CV LOWER VASCULAR RIGHT COMMON FEMORAL AUGMENT: NORMAL
BH CV LOWER VASCULAR RIGHT COMMON FEMORAL COMPETENT: NORMAL
BH CV LOWER VASCULAR RIGHT COMMON FEMORAL COMPRESS: NORMAL
BH CV LOWER VASCULAR RIGHT COMMON FEMORAL PHASIC: NORMAL
BH CV LOWER VASCULAR RIGHT COMMON FEMORAL SPONT: NORMAL
BH CV LOWER VASCULAR RIGHT DISTAL FEMORAL COMPRESS: NORMAL
BH CV LOWER VASCULAR RIGHT DISTAL FEMORAL THROMBUS: NORMAL
BH CV LOWER VASCULAR RIGHT GASTRONEMIUS COMPRESS: NORMAL
BH CV LOWER VASCULAR RIGHT GASTRONEMIUS THROMBUS: NORMAL
BH CV LOWER VASCULAR RIGHT GREATER SAPH AK COMPRESS: NORMAL
BH CV LOWER VASCULAR RIGHT GREATER SAPH BK COMPRESS: NORMAL
BH CV LOWER VASCULAR RIGHT LESSER SAPH COMPRESS: NORMAL
BH CV LOWER VASCULAR RIGHT MID FEMORAL AUGMENT: NORMAL
BH CV LOWER VASCULAR RIGHT MID FEMORAL COMPETENT: NORMAL
BH CV LOWER VASCULAR RIGHT MID FEMORAL COMPRESS: NORMAL
BH CV LOWER VASCULAR RIGHT MID FEMORAL PHASIC: NORMAL
BH CV LOWER VASCULAR RIGHT MID FEMORAL SPONT: NORMAL
BH CV LOWER VASCULAR RIGHT PERONEAL COMPRESS: NORMAL
BH CV LOWER VASCULAR RIGHT POPLITEAL AUGMENT: NORMAL
BH CV LOWER VASCULAR RIGHT POPLITEAL COMPRESS: NORMAL
BH CV LOWER VASCULAR RIGHT POPLITEAL PHASIC: NORMAL
BH CV LOWER VASCULAR RIGHT POPLITEAL SPONT: NORMAL
BH CV LOWER VASCULAR RIGHT POPLITEAL THROMBUS: NORMAL
BH CV LOWER VASCULAR RIGHT POSTERIOR TIBIAL COMPRESS: NORMAL
BH CV LOWER VASCULAR RIGHT PROFUNDA FEMORAL COMPRESS: NORMAL
BH CV LOWER VASCULAR RIGHT PROXIMAL FEMORAL COMPRESS: NORMAL
BH CV LOWER VASCULAR RIGHT SAPHENOFEMORAL JUNCTION COMPRESS: NORMAL
BH CV VAS PRELIMINARY FINDINGS SCRIPTING: 1

## 2023-07-24 PROCEDURE — 93971 EXTREMITY STUDY: CPT

## 2023-07-27 ENCOUNTER — APPOINTMENT (OUTPATIENT)
Dept: CT IMAGING | Facility: HOSPITAL | Age: 58
End: 2023-07-27
Payer: COMMERCIAL

## 2023-07-27 ENCOUNTER — HOSPITAL ENCOUNTER (EMERGENCY)
Facility: HOSPITAL | Age: 58
Discharge: HOME | End: 2023-07-27
Attending: EMERGENCY MEDICINE
Payer: COMMERCIAL

## 2023-07-27 VITALS
OXYGEN SATURATION: 98 % | RESPIRATION RATE: 16 BRPM | HEIGHT: 76 IN | DIASTOLIC BLOOD PRESSURE: 87 MMHG | BODY MASS INDEX: 30.34 KG/M2 | HEART RATE: 71 BPM | SYSTOLIC BLOOD PRESSURE: 134 MMHG | TEMPERATURE: 97.9 F | WEIGHT: 249.12 LBS

## 2023-07-27 DIAGNOSIS — R07.89 ATYPICAL CHEST PAIN: Primary | ICD-10-CM

## 2023-07-27 DIAGNOSIS — Z86.711 HISTORY OF PULMONARY EMBOLISM: ICD-10-CM

## 2023-07-27 DIAGNOSIS — F17.200 CURRENT EVERY DAY SMOKER: ICD-10-CM

## 2023-07-27 LAB
ALBUMIN SERPL-MCNC: 4 G/DL (ref 3.5–5.2)
ALBUMIN/GLOB SERPL: 1.3 G/DL
ALP SERPL-CCNC: 71 U/L (ref 39–117)
ALT SERPL W P-5'-P-CCNC: 11 U/L (ref 1–41)
ANION GAP SERPL CALCULATED.3IONS-SCNC: 10.9 MMOL/L (ref 5–15)
AST SERPL-CCNC: 15 U/L (ref 1–40)
BASOPHILS # BLD AUTO: 0.04 10*3/MM3 (ref 0–0.2)
BASOPHILS NFR BLD AUTO: 0.4 % (ref 0–1.5)
BILIRUB SERPL-MCNC: 0.4 MG/DL (ref 0–1.2)
BUN SERPL-MCNC: 10 MG/DL (ref 6–20)
BUN/CREAT SERPL: 13 (ref 7–25)
CALCIUM SPEC-SCNC: 9.3 MG/DL (ref 8.6–10.5)
CHLORIDE SERPL-SCNC: 103 MMOL/L (ref 98–107)
CO2 SERPL-SCNC: 22.1 MMOL/L (ref 22–29)
CREAT SERPL-MCNC: 0.77 MG/DL (ref 0.76–1.27)
DEPRECATED RDW RBC AUTO: 37.3 FL (ref 37–54)
EGFRCR SERPLBLD CKD-EPI 2021: 105.7 ML/MIN/1.73
EOSINOPHIL # BLD AUTO: 0.11 10*3/MM3 (ref 0–0.4)
EOSINOPHIL NFR BLD AUTO: 1.2 % (ref 0.3–6.2)
ERYTHROCYTE [DISTWIDTH] IN BLOOD BY AUTOMATED COUNT: 12.6 % (ref 12.3–15.4)
GEN 5 2HR TROPONIN T REFLEX: 10 NG/L
GLOBULIN UR ELPH-MCNC: 3 GM/DL
GLUCOSE SERPL-MCNC: 107 MG/DL (ref 65–99)
HCT VFR BLD AUTO: 43.4 % (ref 37.5–51)
HGB BLD-MCNC: 14.2 G/DL (ref 13–17.7)
IMM GRANULOCYTES # BLD AUTO: 0.03 10*3/MM3 (ref 0–0.05)
IMM GRANULOCYTES NFR BLD AUTO: 0.3 % (ref 0–0.5)
LYMPHOCYTES # BLD AUTO: 2.03 10*3/MM3 (ref 0.7–3.1)
LYMPHOCYTES NFR BLD AUTO: 21.3 % (ref 19.6–45.3)
MCH RBC QN AUTO: 26.7 PG (ref 26.6–33)
MCHC RBC AUTO-ENTMCNC: 32.7 G/DL (ref 31.5–35.7)
MCV RBC AUTO: 81.7 FL (ref 79–97)
MONOCYTES # BLD AUTO: 0.45 10*3/MM3 (ref 0.1–0.9)
MONOCYTES NFR BLD AUTO: 4.7 % (ref 5–12)
NEUTROPHILS NFR BLD AUTO: 6.88 10*3/MM3 (ref 1.7–7)
NEUTROPHILS NFR BLD AUTO: 72.1 % (ref 42.7–76)
NRBC BLD AUTO-RTO: 0 /100 WBC (ref 0–0.2)
NT-PROBNP SERPL-MCNC: 33.5 PG/ML (ref 0–900)
PLATELET # BLD AUTO: 282 10*3/MM3 (ref 140–450)
PMV BLD AUTO: 9.1 FL (ref 6–12)
POTASSIUM SERPL-SCNC: 4.3 MMOL/L (ref 3.5–5.2)
PROT SERPL-MCNC: 7 G/DL (ref 6–8.5)
RBC # BLD AUTO: 5.31 10*6/MM3 (ref 4.14–5.8)
SODIUM SERPL-SCNC: 136 MMOL/L (ref 136–145)
TROPONIN T DELTA: 4 NG/L
TROPONIN T SERPL HS-MCNC: 6 NG/L
WBC NRBC COR # BLD: 9.54 10*3/MM3 (ref 3.4–10.8)

## 2023-07-27 PROCEDURE — 83880 ASSAY OF NATRIURETIC PEPTIDE: CPT | Performed by: EMERGENCY MEDICINE

## 2023-07-27 PROCEDURE — 99284 EMERGENCY DEPT VISIT MOD MDM: CPT

## 2023-07-27 PROCEDURE — 80053 COMPREHEN METABOLIC PANEL: CPT | Performed by: EMERGENCY MEDICINE

## 2023-07-27 PROCEDURE — 93005 ELECTROCARDIOGRAM TRACING: CPT

## 2023-07-27 PROCEDURE — 84484 ASSAY OF TROPONIN QUANT: CPT | Performed by: EMERGENCY MEDICINE

## 2023-07-27 PROCEDURE — 85025 COMPLETE CBC W/AUTO DIFF WBC: CPT | Performed by: EMERGENCY MEDICINE

## 2023-07-27 PROCEDURE — 36415 COLL VENOUS BLD VENIPUNCTURE: CPT

## 2023-07-27 PROCEDURE — 93010 ELECTROCARDIOGRAM REPORT: CPT | Performed by: INTERNAL MEDICINE

## 2023-07-27 PROCEDURE — 71275 CT ANGIOGRAPHY CHEST: CPT

## 2023-07-27 PROCEDURE — 25510000001 IOPAMIDOL PER 1 ML: Performed by: EMERGENCY MEDICINE

## 2023-07-27 RX ORDER — SODIUM CHLORIDE 0.9 % (FLUSH) 0.9 %
10 SYRINGE (ML) INJECTION AS NEEDED
Status: DISCONTINUED | OUTPATIENT
Start: 2023-07-27 | End: 2023-07-27 | Stop reason: HOSPADM

## 2023-07-27 RX ADMIN — IOPAMIDOL 95 ML: 755 INJECTION, SOLUTION INTRAVENOUS at 12:12

## 2023-07-27 NOTE — ED NOTES
Pt to ER via personal vehicle for CP and back discomfort that started last night. Pt was diagnosed with a blood clot in R leg and is taking Xarelto. PCP sent him here.    This RN in appropriate PPE while in pt room. Pt wearing mask

## 2023-07-27 NOTE — DISCHARGE INSTRUCTIONS
Follow-up with your primary care provider if symptoms persist.  Return to the emergency department for worsening pain, shortness of breath, nausea, vomiting, sweating, dizziness, fainting, or other concern.

## 2023-07-27 NOTE — ED PROVIDER NOTES
" EMERGENCY DEPARTMENT ENCOUNTER    Room Number:  15/15  PCP: Dereck Resendiz MD  Historian: Patient, mother-in-law      HPI:  Chief Complaint: Chest pain  A complete HPI/ROS/PMH/PSH/SH/FH are unobtainable due to: Nothing  Context: Walker Aldana is a 57 y.o. male who presents to the ED c/o chest pain.  Patient began having some sharp pain in his chest and upper back last night.  When he woke up this morning, his whole chest and upper back felt \"sore\".  Denies shortness of breath, nausea, vomiting, or sweating.  Pain is somewhat worse with movement and taking deep breaths.  Pain is not related to exertion.  Pain does not radiate to the jaw/neck/arms.  Reports a mild nonproductive cough.  Denies fever, chills, sore throat, abdominal pain, palpitations, dizziness, or syncope.  Patient was diagnosed with a DVT in his right leg several days ago and started on Xarelto.  Denies history of PE.  He is a smoker and has a history of hypertension.  He has a family history of CAD.            PAST MEDICAL HISTORY  Active Ambulatory Problems     Diagnosis Date Noted    Nephrolithiasis 05/21/2018    Exudative pharyngitis 05/22/2018    WOJCIECH (acute kidney injury) 05/22/2018    Hydronephrosis 05/22/2018    Leukocytosis 05/22/2018    Tobacco abuse 05/22/2018    Abnormal EKG 02/27/2020    Borderline systolic HTN 02/27/2020    Overweight 02/27/2020    Skipped heart beats 02/27/2020     Resolved Ambulatory Problems     Diagnosis Date Noted    No Resolved Ambulatory Problems     Past Medical History:   Diagnosis Date    Arthritis     Asthma     Kidney stones          PAST SURGICAL HISTORY  Past Surgical History:   Procedure Laterality Date    ADENOIDECTOMY      APPENDECTOMY      CYSTOSCOPY W/ URETERAL STENT PLACEMENT Left 5/22/2018    Procedure: CYSTOSCOPY URETERAL CATHETER/STENT INSERTION;  Surgeon: Baron Graham MD;  Location: [facility] OR;  Service: Urology    JOINT REPLACEMENT      KNEE ARTHROSCOPY Bilateral     KNEE SURGERY " Bilateral     TONSILLECTOMY      TOTAL HIP ARTHROPLASTY Left          FAMILY HISTORY  Family History   Problem Relation Age of Onset    Heart attack Father     Heart disease Father     Hypertension Father     Heart disease Maternal Grandfather     Arrhythmia Maternal Grandfather     Hypertension Mother     Diabetes Mother          SOCIAL HISTORY  Social History     Socioeconomic History    Marital status:    Tobacco Use    Smoking status: Every Day     Packs/day: 1.00     Types: Cigarettes    Smokeless tobacco: Never   Vaping Use    Vaping Use: Never used   Substance and Sexual Activity    Alcohol use: Yes     Comment: socially    Drug use: No    Sexual activity: Defer         ALLERGIES  Bupropion    REVIEW OF SYSTEMS  All systems have been reviewed and are negative except for those listed in the HPI      PHYSICAL EXAM  ED Triage Vitals   Temp Heart Rate Resp BP SpO2   07/27/23 1022 07/27/23 1022 07/27/23 1022 07/27/23 1025 07/27/23 1022   97.9 °F (36.6 °C) 92 18 145/72 97 %      Temp src Heart Rate Source Patient Position BP Location FiO2 (%)   -- -- -- -- --              Physical Exam      GENERAL: Awake, alert, oriented x3.  Well-developed, well-nourished male.  Resting comfortably in no acute distress  HENT: NCAT, nares patent  EYES: no scleral icterus  CV: regular rhythm, normal rate  RESPIRATORY: normal effort, clear to auscultation bilaterally  ABDOMEN: soft, nontender  MUSCULOSKELETAL: Extremities are nontender with full range of motion.  Chest and back are nontender.  No pedal edema.  No calf tenderness  NEURO: Speech is normal.  No facial droop.  Normal strength and light touch sensation in all extremities.  PSYCH:  calm, cooperative  SKIN: warm, dry    Vital signs and nursing notes reviewed.          LAB RESULTS  Recent Results (from the past 24 hour(s))   ECG 12 Lead Chest Pain    Collection Time: 07/27/23 10:25 AM   Result Value Ref Range    QT Interval 379 ms   Comprehensive Metabolic Panel     Collection Time: 07/27/23 10:46 AM    Specimen: Blood   Result Value Ref Range    Glucose 107 (H) 65 - 99 mg/dL    BUN 10 6 - 20 mg/dL    Creatinine 0.77 0.76 - 1.27 mg/dL    Sodium 136 136 - 145 mmol/L    Potassium 4.3 3.5 - 5.2 mmol/L    Chloride 103 98 - 107 mmol/L    CO2 22.1 22.0 - 29.0 mmol/L    Calcium 9.3 8.6 - 10.5 mg/dL    Total Protein 7.0 6.0 - 8.5 g/dL    Albumin 4.0 3.5 - 5.2 g/dL    ALT (SGPT) 11 1 - 41 U/L    AST (SGOT) 15 1 - 40 U/L    Alkaline Phosphatase 71 39 - 117 U/L    Total Bilirubin 0.4 0.0 - 1.2 mg/dL    Globulin 3.0 gm/dL    A/G Ratio 1.3 g/dL    BUN/Creatinine Ratio 13.0 7.0 - 25.0    Anion Gap 10.9 5.0 - 15.0 mmol/L    eGFR 105.7 >60.0 mL/min/1.73   High Sensitivity Troponin T    Collection Time: 07/27/23 10:46 AM    Specimen: Blood   Result Value Ref Range    HS Troponin T 6 <15 ng/L   BNP    Collection Time: 07/27/23 10:46 AM    Specimen: Blood   Result Value Ref Range    proBNP 33.5 0.0 - 900.0 pg/mL   CBC Auto Differential    Collection Time: 07/27/23 10:46 AM    Specimen: Blood   Result Value Ref Range    WBC 9.54 3.40 - 10.80 10*3/mm3    RBC 5.31 4.14 - 5.80 10*6/mm3    Hemoglobin 14.2 13.0 - 17.7 g/dL    Hematocrit 43.4 37.5 - 51.0 %    MCV 81.7 79.0 - 97.0 fL    MCH 26.7 26.6 - 33.0 pg    MCHC 32.7 31.5 - 35.7 g/dL    RDW 12.6 12.3 - 15.4 %    RDW-SD 37.3 37.0 - 54.0 fl    MPV 9.1 6.0 - 12.0 fL    Platelets 282 140 - 450 10*3/mm3    Neutrophil % 72.1 42.7 - 76.0 %    Lymphocyte % 21.3 19.6 - 45.3 %    Monocyte % 4.7 (L) 5.0 - 12.0 %    Eosinophil % 1.2 0.3 - 6.2 %    Basophil % 0.4 0.0 - 1.5 %    Immature Grans % 0.3 0.0 - 0.5 %    Neutrophils, Absolute 6.88 1.70 - 7.00 10*3/mm3    Lymphocytes, Absolute 2.03 0.70 - 3.10 10*3/mm3    Monocytes, Absolute 0.45 0.10 - 0.90 10*3/mm3    Eosinophils, Absolute 0.11 0.00 - 0.40 10*3/mm3    Basophils, Absolute 0.04 0.00 - 0.20 10*3/mm3    Immature Grans, Absolute 0.03 0.00 - 0.05 10*3/mm3    nRBC 0.0 0.0 - 0.2 /100 WBC   High Sensitivity  Troponin T 2Hr    Collection Time: 07/27/23  1:05 PM    Specimen: Blood   Result Value Ref Range    HS Troponin T 10 <15 ng/L    Troponin T Delta 4 (C) >=-4 - <+4 ng/L       Ordered the above labs and reviewed the results.        RADIOLOGY  CT Angiogram Chest    Result Date: 7/27/2023  CT ANGIOGRAM OF THE CHEST. MULTIPLE CORONAL, SAGITTAL, AND 3-D RECONSTRUCTIONS.  HISTORY: Chest pain, DVT right lower extremity  TECHNIQUE: Radiation dose reduction techniques were utilized, including automated exposure control and exposure modulation based on body size. CT angiogram of the chest was performed. Multiple coronal, sagittal, and 3-D reconstruction images were obtained. Image quality somewhat degraded by motion artifact.  COMPARISON:Chest x-ray 07/24/2020  FINDINGS:  Streak artifact from the contrast bolus in the right supraclavicular fossa. The thoracic inlet is within normal limits. Cardiomegaly with left atrial enlargement. No significant pericardial effusion. The right ventricular to left ventricular ratio is 0.9. The main pulmonary artery is 3.4 cm which can be seen in the setting of pulmonary arterial hypertension. The aorta is normal in course and caliber. No central filling defects to suggest pulmonary embolus. Evaluation of the most peripheral branches is somewhat limited by decreased opacification/motion. If there is persistent high clinical suspicion consider further evaluation. Mildly prominent mediastinal nodes likely reactive and index right hilar node is 1.1 cm in short axis.  Hepatic steatosis. Calcified granuloma spleen. The upper abdomen is otherwise unremarkable.  Patent central airway. No focal consolidations, effusions, or pneumothorax. Minimal dependent bibasilar atelectasis and/or scarring. Calcified granulomas. Punctate micronodule right upper lobe.  Multilevel degenerative changes.      1.  No definitive central pulmonary embolus. If symptoms persist/worsen or there is high clinical suspicion  consider close follow-up. 2.  Calcified and noncalcified micronodules favoring the sequela of prior granulomatous disease. If this is a high-risk patient or smoker recommend one year follow-up. 3.  Hepatic steatosis. 4.  Calcified and noncalcified mildly prominent mediastinal nodes, likely the sequela of prior granulomatous disease. 5.  Please see above for additional findings/recommendations.  6.  This report was finalized on 7/27/2023 1:11 PM by Dr. Selma Gill M.D.       Ordered the above noted radiological studies. Reviewed by me in PACS.            PROCEDURES  Procedures              MEDICATIONS GIVEN IN ER  Medications   sodium chloride 0.9 % flush 10 mL (has no administration in time range)   iopamidol (ISOVUE-370) 76 % injection 100 mL (95 mL Intravenous Given 7/27/23 1212)                   MEDICAL DECISION MAKING, PROGRESS, and CONSULTS    All labs have been independently reviewed by me.  All radiology studies have been reviewed by me and I have also reviewed the radiology report.   EKG's independently viewed and interpreted by me.  Discussion below represents my analysis of pertinent findings related to patient's condition, differential diagnosis, treatment plan and final disposition.      Additional sources:  - Discussed/ obtained information from independent historians: Mother-in-law at bedside    - External (non-ED) record review: Patient saw his PCP on 7/24/2023 for right knee pain.  Duplex of the right leg showed acute DVT in the right distal femoral, popliteal, and gastrocnemius veins..   Stress test was done in March 2020 and findings were consistent with a low risk study.    - Chronic or social conditions impacting care: N/A          Orders placed during this visit:  Orders Placed This Encounter   Procedures    CT Angiogram Chest    Comprehensive Metabolic Panel    High Sensitivity Troponin T    BNP    CBC Auto Differential    High Sensitivity Troponin T 2Hr    Monitor Blood Pressure    Pulse  Oximetry, Continuous    ECG 12 Lead Chest Pain    Insert Peripheral IV    CBC & Differential         Additional orders considered but not ordered:  N/A        Differential diagnosis:    DDx includes but is not limited to acute coronary syndrome, pulmonary embolism, thoracic aortic dissection, pneumonia, pneumothorax, musculoskeletal pain, GERD or esophageal spasm, anxiety, myocarditis/pericarditis, esophageal rupture, pancreatitis.             Independent interpretation of labs, radiology studies, and discussions with consultants:  ED Course as of 07/27/23 1447   Thu Jul 27, 2023   1044 EKG personally interpreted by me.  My personal interpretation is:         EKG time: 10:25 AM  Rhythm/Rate: Sinus rhythm with PAC, rate 71  P waves and MT: First-degree AV block  QRS, axis: Normal axis, anteroseptal Q waves  ST and T waves: Normal    Interpreted Contemporaneously by me, independently viewed  EKG is not significantly changed compared to prior EKG done on 7/24/2020   [WH]   1132 HS Troponin T: 6 [WH]   1132 proBNP: 33.5 [WH]   1132 WBC: 9.54 [WH]   1132 Hemoglobin: 14.2 [WH]   1132 Creatinine: 0.77 [WH]   1225 CTA chest personally interpreted by me.  My personal interpretation is: No central pulmonary embolus.  Lungs are clear.  No pneumothorax. [WH]   1318 Per the radiologist, CTA is negative for central pulmonary embolus.  See dictated report for details. [WH]   1330 HEART SCORE:    History #0  (Highly suspicious 2, Moderately suspicious 1, Slightly or non-suspicious 0)    ECG #0  (Significant ST depression 2,  Nonspecific repol disturbance 1, Normal 0)    Age #1  (> or = 65 2, 46-65 1,  < or = 45 0)    Risk factors #2  (hypercholesterolemia, HTN, DM, smoking, pos fam hx, obesity)  (> or = to 3 RF 2, 1 or 2 1, No risk factors 0)    Troponin #0  (> or = 3x normal limit 2, 1-3x normal limit 1, < or = Normal limit 0)    HEART Score is 3 [WH]   1350 HS Troponin T: 10 [WH]   1350 Troponin T Delta(!!): 4 [WH]   1407 Test  results discussed with the patient and his mother-in-law.  He is resting comfortably.  Pain has basically resolved.  Patient was advised follow with his PCP.  Return precautions were discussed. [WH]   1876 Patient presented to the ED complaining of chest discomfort.  Pain began last night and has persisted since then.  His pain was atypical.  EKG did not have any acute ischemic changes.  Troponin was negative x2.  Heart score is 3.  Patient was recently diagnosed with a DVT and is currently taking Xarelto.  CT of the chest was performed to evaluate for possible PE.  CTA of the chest was negative acute.  Patient was resting comfortably on the ED.   [WH]      ED Course User Index  [WH] Jose Warner MD               DIAGNOSIS  Final diagnoses:   Atypical chest pain   History of pulmonary embolism   Current every day smoker         DISPOSITION  DISCHARGE    Patient discharged in stable condition.    Reviewed implications of results, diagnosis, meds, responsibility to follow up, warning signs and symptoms of possible worsening, potential complications and reasons to return to ER, including any/persistent pain, shortness of breath, nausea, vomiting, sweating, pain radiating to neck/jaw/arm, or other concern.    Patient/Family voiced understanding of above instructions.    Discussed plan for discharge, as there is no emergent indication for admission. Patient referred to primary care provider for BP management due to today's BP. Pt/family is agreeable and understands need for follow up and repeat testing.  Pt is aware that discharge does not mean that nothing is wrong but it indicates no emergency is present that requires admission and they must continue care with follow-up as given below or physician of their choice.     FOLLOW-UP  Dereck Resendiz MD  [address] [id number] [phone]    Schedule an appointment as soon as possible for a visit            Medication List      No  changes were made to your prescriptions during this visit.                   Latest Documented Vital Signs:  As of 14:47 EDT  BP- 134/87 HR- 71 Temp- 97.9 °F (36.6 °C) O2 sat- 98%              --    Please note that portions of this were completed with a voice recognition program.       Note Disclaimer: At [facility], we believe that sharing information builds trust and better relationships. You are receiving this note because you are receiving care at [facility] or recently visited. It is possible you will see health information before a provider has talked with you about it. This kind of information can be easy to misunderstand. To help you fully understand what it means for your health, we urge you to discuss this note with your provider.             Jose Warner MD  07/27/23 [id number]

## 2023-07-28 LAB — QT INTERVAL: 379 MS

## 2024-05-20 ENCOUNTER — OFFICE VISIT (OUTPATIENT)
Dept: CARDIOLOGY | Facility: CLINIC | Age: 59
End: 2024-05-20
Payer: COMMERCIAL

## 2024-05-20 VITALS
BODY MASS INDEX: 31.05 KG/M2 | DIASTOLIC BLOOD PRESSURE: 82 MMHG | SYSTOLIC BLOOD PRESSURE: 120 MMHG | WEIGHT: 255 LBS | HEART RATE: 78 BPM | HEIGHT: 76 IN

## 2024-05-20 DIAGNOSIS — R94.31 ABNORMAL EKG: Primary | ICD-10-CM

## 2024-05-20 DIAGNOSIS — I10 PRIMARY HYPERTENSION: ICD-10-CM

## 2024-05-20 PROCEDURE — 93000 ELECTROCARDIOGRAM COMPLETE: CPT | Performed by: INTERNAL MEDICINE

## 2024-05-20 PROCEDURE — 99213 OFFICE O/P EST LOW 20 MIN: CPT | Performed by: INTERNAL MEDICINE

## 2024-05-20 RX ORDER — ROSUVASTATIN CALCIUM 5 MG/1
5 TABLET, COATED ORAL NIGHTLY
COMMUNITY
Start: 2024-04-30

## 2024-05-20 NOTE — PROGRESS NOTES
"1 YR FOLLOW UP    Subjective:        Walker Aldana is a 58 y.o. male who here for follow up    CC  Follow-up abnormal EKG and hypertension  HPI  58 years old male with abnormal EKG and hypertension here for the follow-up with no chest pains or tightness in the chest     Problems Addressed this Visit          Cardiac and Vasculature    Abnormal EKG - Primary    Primary hypertension     Diagnoses         Codes Comments    Abnormal EKG    -  Primary ICD-10-CM: R94.31  ICD-9-CM: 794.31     Primary hypertension     ICD-10-CM: I10  ICD-9-CM: 401.9           .    The following portions of the patient's history were reviewed and updated as appropriate: allergies, current medications, past family history, past medical history, past social history, past surgical history and problem list.    Past Medical History:   Diagnosis Date    Arthritis     Asthma     Kidney stones     has a stent     reports that he has been smoking cigarettes. He has never used smokeless tobacco. He reports current alcohol use. He reports that he does not use drugs.   Family History   Problem Relation Age of Onset    Heart attack Father     Heart disease Father     Hypertension Father     Heart disease Maternal Grandfather     Arrhythmia Maternal Grandfather     Hypertension Mother     Diabetes Mother        Review of Systems  Constitutional: No wt loss, fever, fatigue  Gastrointestinal: No nausea, abdominal pain  Behavioral/Psych: No insomnia or anxiety   Cardiovascular no chest pains or tightness in the chest  Objective:       Physical Exam           Physical Exam  /82   Pulse 78   Ht 193 cm (76\")   Wt 116 kg (255 lb)   BMI 31.04 kg/m²     General appearance: No acute changes   Eyes: Sclerae conjunctivae normal, pupils reactive   HENT: Atraumatic; oropharynx clear with moist mucous membranes and no mucosal ulcerations;  Neck: Trachea midline; NECK, supple, no thyromegaly or lymphadenopathy   Lungs: Normal size and shape, normal breath " sounds, equal distribution of air, no rales and rhonchi   CV: S1-S2 regular, no murmurs, no rub, no gallop   Abdomen: Soft, nontender; no masses , no abnormal abdominal sounds   Extremities: No deformity , normal color , no peripheral edema   Skin: Normal temperature, turgor and texture; no rash, ulcers  Psych: Appropriate affect, alert and oriented to person, place and time             ECG 12 Lead    Date/Time: 5/20/2024 2:22 PM  Performed by: Claudia Velez MD    Authorized by: Claudia Velez MD  Comparison: compared with previous ECG   Similar to previous ECG  Rhythm: sinus rhythm  ST Flattening: all    Clinical impression: non-specific ECG            Echocardiogram:    Results for orders placed in visit on 02/27/20    Adult Transthoracic Echo Complete W/ Cont if Necessary Per Protocol    Interpretation Summary  · Saline bubble test neg for PFO  · Calculated EF = 62%  · There is no evidence of pericardial effusion.          Current Outpatient Medications:     amLODIPine (NORVASC) 10 MG tablet, TAKE ONE TABLET BY MOUTH DAILY, Disp: 30 tablet, Rfl: 3    rosuvastatin (CRESTOR) 5 MG tablet, Take 1 tablet by mouth Every Night., Disp: , Rfl:    Assessment:                Plan:          ICD-10-CM ICD-9-CM   1. Abnormal EKG  R94.31 794.31   2. Primary hypertension  I10 401.9     1. Abnormal EKG  No angina pectoris    2. Primary hypertension  Continue current treatment    1 YR  COUNSELING:    Walker Woody was given to patient for the following topics: diagnostic results, risk factor reductions, impressions, risks and benefits of treatment options and importance of treatment compliance .       SMOKING COUNSELING:        Dictated using Dragon dictation

## 2025-06-10 ENCOUNTER — OFFICE VISIT (OUTPATIENT)
Dept: CARDIOLOGY | Facility: CLINIC | Age: 60
End: 2025-06-10
Payer: COMMERCIAL

## 2025-06-10 VITALS
HEIGHT: 76 IN | BODY MASS INDEX: 31.9 KG/M2 | HEART RATE: 78 BPM | WEIGHT: 262 LBS | SYSTOLIC BLOOD PRESSURE: 137 MMHG | DIASTOLIC BLOOD PRESSURE: 76 MMHG

## 2025-06-10 DIAGNOSIS — E78.5 HYPERLIPIDEMIA, UNSPECIFIED HYPERLIPIDEMIA TYPE: ICD-10-CM

## 2025-06-10 DIAGNOSIS — I10 PRIMARY HYPERTENSION: ICD-10-CM

## 2025-06-10 DIAGNOSIS — R94.31 ABNORMAL EKG: Primary | ICD-10-CM

## 2025-06-10 PROCEDURE — 93000 ELECTROCARDIOGRAM COMPLETE: CPT

## 2025-06-10 PROCEDURE — 99214 OFFICE O/P EST MOD 30 MIN: CPT

## 2025-06-10 NOTE — PROGRESS NOTES
Subjective:        Walker Aldana is a 59 y.o. male who here for follow up    No chief complaint on file.      HPI    Walker Aldana is a 59 year old male with hypertension.  He is seen in office today for annual follow-up. No chest pain or shortness of breath    Echo 2020 normal ef and LV function, negative bubble study. Stress test 2020 was a normal myocardial perfusion study with no evidence of ischemia.     The following portions of the patient's history were reviewed and updated as appropriate: allergies, current medications, past family history, past medical history, past social history, past surgical history and problem list.    Past Medical History:   Diagnosis Date    Arthritis     Asthma     Kidney stones     has a stent         reports that he has been smoking cigarettes. He has never used smokeless tobacco. He reports current alcohol use. He reports that he does not use drugs.     Family History   Problem Relation Age of Onset    Heart attack Father     Heart disease Father     Hypertension Father     Heart disease Maternal Grandfather     Arrhythmia Maternal Grandfather     Hypertension Mother     Diabetes Mother              Objective:           Vitals and nursing note reviewed.   Constitutional:       Appearance: Well-developed.   HENT:      Head: Normocephalic.      Right Ear: External ear normal.      Left Ear: External ear normal.   Neck:      Vascular: No JVD.   Pulmonary:      Effort: Pulmonary effort is normal. No respiratory distress.      Breath sounds: Normal breath sounds. No stridor. No rales.   Cardiovascular:      Normal rate. Regular rhythm.      No gallop.    Pulses:     Intact distal pulses.   Edema:     Peripheral edema absent.   Abdominal:      General: Bowel sounds are normal. There is no distension.      Palpations: Abdomen is soft.      Tenderness: There is no abdominal tenderness. There is no guarding.   Musculoskeletal: Normal range of motion.         General: No tenderness.       Cervical back: Normal range of motion. Skin:     General: Skin is warm.   Neurological:      Mental Status: Alert and oriented to person, place, and time.      Deep Tendon Reflexes: Reflexes are normal and symmetric.   Psychiatric:         Judgment: Judgment normal.           ECG 12 Lead    Date/Time: 6/10/2025 3:11 PM  Performed by: Carito Mendes APRN    Authorized by: Carito Mendes APRN  Comparison: compared with previous ECG from 5/20/2024  Similar to previous ECG  Rhythm: sinus rhythm  Rate: normal  BPM: 77    Clinical impression: abnormal EKG        Interpretation Summary       Low risk for ischemic heart disease.  Findings consistent with an abnormal ECG stress test.  Patient has claustrophobia, which caused difficulty with imaging..  Left ventricular ejection fraction is normal (Calculated EF = 60%).  Myocardial perfusion imaging indicates a normal myocardial perfusion study with no evidence of ischemia.  Impressions are consistent with a low risk study.  Interpretation Summary    Saline bubble test neg for PFO  Calculated EF = 62%  There is no evidence of pericardial effusion.           Current Outpatient Medications:     amLODIPine (NORVASC) 10 MG tablet, TAKE ONE TABLET BY MOUTH DAILY, Disp: 30 tablet, Rfl: 3    rosuvastatin (CRESTOR) 5 MG tablet, Take 1 tablet by mouth Every Night., Disp: , Rfl:      Assessment:        Patient Active Problem List   Diagnosis    Nephrolithiasis    Exudative pharyngitis    WOJCIECH (acute kidney injury)    Hydronephrosis    Leukocytosis    Tobacco abuse    Abnormal EKG    Borderline systolic HTN    Overweight    Skipped heart beats    Primary hypertension               Plan:   1. Hypertension: controlled, continue amlodipine    Importance of controlling hypertension and blood pressure  checkup on the regular basis has been explained. Hypertension as a silent killer has been discussed. Risk reduction of the weight and regular exercises to control the hypertension has  been explained.    2. Hyperlipidemia: he was started on rosuvastatin by pcp, he had muscle cramps with this so he stopped, he tells me he will follow back up with pcp for further recs.     Risk of the hyperlipidemia, importance of the treatment has been explained. Pros and cons of the statins has been explained. Regular blood workup as well as side effects including the liver failure, myelopathy death has been explained.    3. Abnormal ECG: no chest pain, will check cardiolite as it has been 5 years since last stress.     It was explained to the patient that stress testing carries 85% specificity/sensitivity, and does not rule out future cardiac event.  Risks of the procedure were explained to the patient including shortness of breath, induction of myocardial infarction, and dizziness.  Patient is agreeable to proceeding with stress testing.                No diagnosis found.    There are no diagnoses linked to this encounter.    COUNSELING: Megan Woody was given to patient for the following topics: diagnostic results, risk factor reductions, impressions, risks and benefits of treatment options and importance of treatment compliance .       SMOKING COUNSELING: encouraged cessation    Cardiolite, if ok, Follow up in 1 year or sooner if needed    Sincerely,   RUIZ Aaron  [facility]  06/10/25  14:58 EDT    EMR Dragon/Transcription disclaimer:   Much of this encounter note is an electronic transcription/translation of spoken language to printed text. The electronic translation of spoken language may permit erroneous, or at times, nonsensical words or phrases to be inadvertently transcribed; Although I have reviewed the note for such errors, some may still exist.

## 2025-07-09 ENCOUNTER — HOSPITAL ENCOUNTER (OUTPATIENT)
Dept: CARDIOLOGY | Facility: HOSPITAL | Age: 60
Discharge: HOME | End: 2025-07-09
Payer: COMMERCIAL

## 2025-07-09 ENCOUNTER — HOSPITAL ENCOUNTER (OUTPATIENT)
Dept: CARDIOLOGY | Facility: HOSPITAL | Age: 60
End: 2025-07-09
Payer: COMMERCIAL

## 2025-07-09 VITALS
DIASTOLIC BLOOD PRESSURE: 89 MMHG | OXYGEN SATURATION: 97 % | HEIGHT: 76 IN | WEIGHT: 262 LBS | BODY MASS INDEX: 31.9 KG/M2 | SYSTOLIC BLOOD PRESSURE: 150 MMHG | HEART RATE: 54 BPM

## 2025-07-09 DIAGNOSIS — R94.31 ABNORMAL EKG: ICD-10-CM

## 2025-07-09 LAB
BH CV IMMEDIATE POST RECOVERY TECH DATA SYMPTOMS: NORMAL
BH CV IMMEDIATE POST TECH DATA BLOOD PRESSURE: NORMAL MMHG
BH CV IMMEDIATE POST TECH DATA HEART RATE: 100 BPM
BH CV IMMEDIATE POST TECH DATA OXYGEN SATS: 99 %
BH CV REST NUCLEAR ISOTOPE DOSE: 10.3 MCI
BH CV SIX MINUTE RECOVERY TECH DATA BLOOD PRESSURE: NORMAL
BH CV SIX MINUTE RECOVERY TECH DATA HEART RATE: 83 BPM
BH CV SIX MINUTE RECOVERY TECH DATA OXYGEN SATURATION: 99 %
BH CV SIX MINUTE RECOVERY TECH DATA SYMPTOMS: NORMAL
BH CV STRESS BP STAGE 1: NORMAL
BH CV STRESS BP STAGE 2: NORMAL
BH CV STRESS BP STAGE 3: NORMAL
BH CV STRESS DURATION MIN STAGE 1: 3
BH CV STRESS DURATION MIN STAGE 2: 3
BH CV STRESS DURATION MIN STAGE 3: 3
BH CV STRESS DURATION SEC STAGE 1: 0
BH CV STRESS DURATION SEC STAGE 2: 0
BH CV STRESS DURATION SEC STAGE 3: 39
BH CV STRESS GRADE STAGE 1: 10
BH CV STRESS GRADE STAGE 2: 12
BH CV STRESS GRADE STAGE 3: 14
BH CV STRESS HR STAGE 1: 108
BH CV STRESS HR STAGE 2: 120
BH CV STRESS HR STAGE 3: 140
BH CV STRESS METS STAGE 1: 4.6
BH CV STRESS METS STAGE 2: 7.1
BH CV STRESS METS STAGE 3: 10.2
BH CV STRESS PROTOCOL 1: NORMAL
BH CV STRESS RECOVERY BP: NORMAL MMHG
BH CV STRESS RECOVERY HR: 82 BPM
BH CV STRESS SPEED STAGE 1: 1.7
BH CV STRESS SPEED STAGE 2: 2.5
BH CV STRESS SPEED STAGE 3: 3.4
BH CV STRESS STAGE 1: 1
BH CV STRESS STAGE 2: 2
BH CV STRESS STAGE 3: 3
BH CV THREE MINUTE POST TECH DATA BLOOD PRESSURE: NORMAL MMHG
BH CV THREE MINUTE POST TECH DATA HEART RATE: 92 BPM
BH CV THREE MINUTE POST TECH DATA OXYGEN SATURATION: 99 %
BH CV THREE MINUTE RECOVERY TECH DATA SYMPTOM: NORMAL
MAXIMAL PREDICTED HEART RATE: 163 BPM
PERCENT MAX PREDICTED HR: 85.89 %
STRESS BASELINE BP: NORMAL MMHG
STRESS BASELINE HR: 83 BPM
STRESS O2 SAT REST: 97 %
STRESS PERCENT HR: 101 %
STRESS POST ESTIMATED WORKLOAD: 10.2 METS
STRESS POST EXERCISE DUR MIN: 9 MIN
STRESS POST EXERCISE DUR SEC: 39 SEC
STRESS POST PEAK BP: NORMAL MMHG
STRESS POST PEAK HR: 140 BPM
STRESS TARGET HR: 139 BPM

## 2025-07-09 PROCEDURE — A9500 TC99M SESTAMIBI: HCPCS | Performed by: INTERNAL MEDICINE

## 2025-07-09 PROCEDURE — 34310000005 TECHNETIUM SESTAMIBI: Performed by: INTERNAL MEDICINE

## 2025-07-09 PROCEDURE — 93017 CV STRESS TEST TRACING ONLY: CPT

## 2025-07-09 PROCEDURE — 78451 HT MUSCLE IMAGE SPECT SING: CPT

## 2025-07-09 RX ADMIN — TECHNETIUM TC 99M SESTAMIBI 1 DOSE: 1 INJECTION INTRAVENOUS at 07:30

## 2025-07-17 LAB
BH CV IMMEDIATE POST RECOVERY TECH DATA SYMPTOMS: NORMAL
BH CV IMMEDIATE POST TECH DATA BLOOD PRESSURE: NORMAL MMHG
BH CV IMMEDIATE POST TECH DATA HEART RATE: 100 BPM
BH CV IMMEDIATE POST TECH DATA OXYGEN SATS: 99 %
BH CV SIX MINUTE RECOVERY TECH DATA BLOOD PRESSURE: NORMAL
BH CV SIX MINUTE RECOVERY TECH DATA HEART RATE: 83 BPM
BH CV SIX MINUTE RECOVERY TECH DATA OXYGEN SATURATION: 99 %
BH CV SIX MINUTE RECOVERY TECH DATA SYMPTOMS: NORMAL
BH CV STRESS BP STAGE 1: NORMAL
BH CV STRESS BP STAGE 2: NORMAL
BH CV STRESS BP STAGE 3: NORMAL
BH CV STRESS DURATION MIN STAGE 1: 3
BH CV STRESS DURATION MIN STAGE 2: 3
BH CV STRESS DURATION MIN STAGE 3: 3
BH CV STRESS DURATION SEC STAGE 1: 0
BH CV STRESS DURATION SEC STAGE 2: 0
BH CV STRESS DURATION SEC STAGE 3: 39
BH CV STRESS GRADE STAGE 1: 10
BH CV STRESS GRADE STAGE 2: 12
BH CV STRESS GRADE STAGE 3: 14
BH CV STRESS HR STAGE 1: 108
BH CV STRESS HR STAGE 2: 120
BH CV STRESS HR STAGE 3: 140
BH CV STRESS METS STAGE 1: 4.6
BH CV STRESS METS STAGE 2: 7.1
BH CV STRESS METS STAGE 3: 10.2
BH CV STRESS PROTOCOL 1: NORMAL
BH CV STRESS RECOVERY BP: NORMAL MMHG
BH CV STRESS RECOVERY HR: 82 BPM
BH CV STRESS SPEED STAGE 1: 1.7
BH CV STRESS SPEED STAGE 2: 2.5
BH CV STRESS SPEED STAGE 3: 3.4
BH CV STRESS STAGE 1: 1
BH CV STRESS STAGE 2: 2
BH CV STRESS STAGE 3: 3
BH CV THREE MINUTE POST TECH DATA BLOOD PRESSURE: NORMAL MMHG
BH CV THREE MINUTE POST TECH DATA HEART RATE: 92 BPM
BH CV THREE MINUTE POST TECH DATA OXYGEN SATURATION: 99 %
BH CV THREE MINUTE RECOVERY TECH DATA SYMPTOM: NORMAL
MAXIMAL PREDICTED HEART RATE: 163 BPM
PERCENT MAX PREDICTED HR: 85.89 %
STRESS BASELINE BP: NORMAL MMHG
STRESS BASELINE HR: 83 BPM
STRESS O2 SAT REST: 97 %
STRESS PERCENT HR: 101 %
STRESS POST ESTIMATED WORKLOAD: 10.2 METS
STRESS POST EXERCISE DUR MIN: 9 MIN
STRESS POST EXERCISE DUR SEC: 39 SEC
STRESS POST PEAK BP: NORMAL MMHG
STRESS POST PEAK HR: 140 BPM
STRESS TARGET HR: 139 BPM

## 2025-07-18 ENCOUNTER — RESULTS FOLLOW-UP (OUTPATIENT)
Dept: CARDIOLOGY | Facility: CLINIC | Age: 60
End: 2025-07-18
Payer: COMMERCIAL

## (undated) DEVICE — LOU CYSTO: Brand: MEDLINE INDUSTRIES, INC.

## (undated) DEVICE — TIDISHIELD UROLOGY DRAIN BAGS FROSTY VINYL STERILE FITS SIEMENS UROSKOP ACCESS 20 PER CASE: Brand: TIDISHIELD

## (undated) DEVICE — CATH URETRL FLXITP POLLACK STD 5F 70CM

## (undated) DEVICE — SYR LUERLOK 20CC

## (undated) DEVICE — GLV SURG BIOGEL LTX PF 7 1/2

## (undated) DEVICE — GW PTFE FIX/CORE FLXTIP .035 3X150CM

## (undated) DEVICE — CATH URETRL PA CONE TP 8F

## (undated) DEVICE — GOWN,NON-REINFORCED,SIRUS,SET IN SLV,XL: Brand: MEDLINE

## (undated) DEVICE — NITINOL WIRE WITH HYDROPHILIC TIP: Brand: SENSOR